# Patient Record
Sex: FEMALE | Race: WHITE | NOT HISPANIC OR LATINO | Employment: FULL TIME | URBAN - METROPOLITAN AREA
[De-identification: names, ages, dates, MRNs, and addresses within clinical notes are randomized per-mention and may not be internally consistent; named-entity substitution may affect disease eponyms.]

---

## 2017-05-18 ENCOUNTER — TELEPHONE (OUTPATIENT)
Dept: FAMILY MEDICINE | Facility: CLINIC | Age: 47
End: 2017-05-18

## 2017-05-18 NOTE — TELEPHONE ENCOUNTER
Patient declined assessment and states she is safe.  Denies thoughts of suicide and states been on Zoloft for an increasted time and thinks she would like to explore other medication options as she is also going through divorce.  Ok to wait and will call with any further concerns.  Maeve West RN  [facility] [employer]

## 2017-05-18 NOTE — TELEPHONE ENCOUNTER
[facility].  Routing to Triage to see if this requires a phone call      Sara LARSON

## 2017-05-31 ENCOUNTER — OFFICE VISIT (OUTPATIENT)
Dept: FAMILY MEDICINE | Facility: CLINIC | Age: 47
End: 2017-05-31
Payer: COMMERCIAL

## 2017-05-31 VITALS
TEMPERATURE: 98.1 F | BODY MASS INDEX: 31.64 KG/M2 | DIASTOLIC BLOOD PRESSURE: 76 MMHG | SYSTOLIC BLOOD PRESSURE: 110 MMHG | HEART RATE: 99 BPM | HEIGHT: 68 IN | WEIGHT: 208.8 LBS | OXYGEN SATURATION: 98 %

## 2017-05-31 DIAGNOSIS — F33.1 MODERATE EPISODE OF RECURRENT MAJOR DEPRESSIVE DISORDER (H): Primary | ICD-10-CM

## 2017-05-31 DIAGNOSIS — E66.811 OBESITY, CLASS I, BMI 30-34.9: ICD-10-CM

## 2017-05-31 DIAGNOSIS — F41.1 GENERALIZED ANXIETY DISORDER: ICD-10-CM

## 2017-05-31 PROCEDURE — 99213 OFFICE O/P EST LOW 20 MIN: CPT | Performed by: NURSE PRACTITIONER

## 2017-05-31 RX ORDER — BUPROPION HYDROCHLORIDE 150 MG/1
150 TABLET ORAL EVERY MORNING
Qty: 30 TABLET | Refills: 1 | Status: SHIPPED | OUTPATIENT
Start: 2017-05-31 | End: 2017-06-29 | Stop reason: DRUGHIGH

## 2017-05-31 ASSESSMENT — ANXIETY QUESTIONNAIRES
3. WORRYING TOO MUCH ABOUT DIFFERENT THINGS: MORE THAN HALF THE DAYS
7. FEELING AFRAID AS IF SOMETHING AWFUL MIGHT HAPPEN: SEVERAL DAYS
5. BEING SO RESTLESS THAT IT IS HARD TO SIT STILL: NOT AT ALL
6. BECOMING EASILY ANNOYED OR IRRITABLE: SEVERAL DAYS
IF YOU CHECKED OFF ANY PROBLEMS ON THIS QUESTIONNAIRE, HOW DIFFICULT HAVE THESE PROBLEMS MADE IT FOR YOU TO DO YOUR WORK, TAKE CARE OF THINGS AT HOME, OR GET ALONG WITH OTHER PEOPLE: SOMEWHAT DIFFICULT
1. FEELING NERVOUS, ANXIOUS, OR ON EDGE: MORE THAN HALF THE DAYS
2. NOT BEING ABLE TO STOP OR CONTROL WORRYING: SEVERAL DAYS
GAD7 TOTAL SCORE: 9

## 2017-05-31 ASSESSMENT — PATIENT HEALTH QUESTIONNAIRE - PHQ9: 5. POOR APPETITE OR OVEREATING: MORE THAN HALF THE DAYS

## 2017-05-31 NOTE — PROGRESS NOTES
SUBJECTIVE:                                                    Eileen Valladares is a 46 year old female who presents to clinic today for the following health issues:        Medication Followup of Zoloft      Taking Medication as prescribed: yes    Side Effects:  None    Medication Helping Symptoms:  No     Abnormal Mood Symptoms     Onset: x 1 1/2 years ago     Description:   Depression: YES  Anxiety: no     Accompanying Signs & Symptoms:  Still participating in activities that you used to enjoy: YES  Fatigue: YES  Irritability: YES, sometime  Difficulty concentrating: YES  Changes in appetite: no  Problems with sleep: YES  Heart racing/beating fast : no  Thoughts of hurting yourself or others: none     History:   Recent stress: YES- Divorce and mother health condition   Prior depression hospitalization: None  Family history of depression: YES-  Mother   Family history of anxiety: YES- sister       Precipitating factors:   Alcohol/drug use: YES-  Soon to be ex -     Alleviating factors:  Trying to sleep        Therapies Tried and outcome: Zoloft (Sertraline)           Problem list and histories reviewed & adjusted, as indicated.    Additional history:  Under a lot of stress right now.  Divorce almost final. Has 2 sons ages 19 & 16. They live with father. She is sad she doesn't see them that often. She has a new BF that the younger son doesn't like.  Her mother is ill after having thyroid surgery. A sister lives with her.  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  Takes sertraline 150 mg as prescribed by her gynecologist for depression and anxiety. Depression is worse. Comes home from work and just wants to sit. Weight stable but wants to lose weight.     Patient Active Problem List   Diagnosis     GRANULOMA LUNG     CYST KIDNEY     Generalized anxiety disorder     Hyperlipidemia with target LDL less than 130     Temporomandibular joint disorder     Obesity, Class I, BMI 30-34.9     Past Surgical History:    Procedure Laterality Date     BIOPSY Bilateral Breast     benign     COLONOSCOPY       negative     DILATION AND CURETTAGE      miscarriage     TONSILLECTOMY & ADENOIDECTOMY      as a child       Social History   Substance Use Topics     Smoking status: Never Smoker     Smokeless tobacco: Never Used     Alcohol use 0.6 oz/week     1 Standard drinks or equivalent per week      Comment: 1 drink per week     Family History   Problem Relation Age of Onset     Hypertension Mother      Other Cancer Mother      Lung () & Kidney ()     Depression Mother      C.A.D. Father       age 64 of  ischemic bowel     Colon Polyps Father      benign     DIABETES Father       at age 64     Hypertension Father      also high cholesterol     Hyperlipidemia Father      CEREBROVASCULAR DISEASE Father      ischemic strokes, questionable alzheimers, first cva at 58, first MI in his 40s, 2 CABG procedures for MIs     CANCER Paternal Grandmother      Cardiovascular Paternal Uncle      stroke in his 50s     DIABETES Maternal Grandmother       at age 76     Hypertension Maternal Grandmother      DIABETES Maternal Grandfather      diet at age 76     Hypertension Maternal Grandfather      Anxiety Disorder Sister          Current Outpatient Prescriptions   Medication Sig Dispense Refill     buPROPion (WELLBUTRIN XL) 150 MG 24 hr tablet Take 1 tablet (150 mg) by mouth every morning 30 tablet 1     Cholecalciferol (VITAMIN D) 2000 UNITS tablet Take 2,000 Units by mouth daily 100 tablet 3     CALCIUM + D 600-200 MG-UNIT OR TABS 2 TABLET DAILY       ZOLOFT 100 MG OR TABS 1.5 tabs every day (Patient taking differently: 1.5 tabs every day 150MG DAILY) 45 11     MULTI-VITAMIN OR TABS        clindamycin-benzoyl peroxide (BENZACLIN) gel Apply topically 2 times daily 50 g 11       ROS:  C: NEGATIVE for fever, chills, change in weight  E/M: NEGATIVE for ear, mouth and throat problems  R: NEGATIVE for significant cough or  "SOB  CV: NEGATIVE for chest pain, palpitations or peripheral edema    OBJECTIVE:                                                    /76 (BP Location: Right arm, Patient Position: Chair, Cuff Size: Adult Regular)  Pulse 99  Temp 98.1  F (36.7  C) (Tympanic)  Ht 5' 8\" (1.727 m)  Wt 208 lb 12.8 oz (94.7 kg)  SpO2 98%  BMI 31.75 kg/m2  Body mass index is 31.75 kg/(m^2).   GENERAL: healthy, alert, well nourished, well hydrated, no distress  HENT: ear canals- normal; TMs- normal; Nose- normal; Mouth- no ulcers, no lesions  NECK: no tenderness, no adenopathy, no asymmetry, no masses, no stiffness; thyroid- normal to palpation  RESP: lungs clear to auscultation - no rales, no rhonchi, no wheezes  CV: regular rates and rhythm, normal S1 S2, no S3 or S4 and no murmur, no click or rub -  ABDOMEN: soft, no tenderness, no  hepatosplenomegaly, no masses, normal bowel sounds  PSYCH: Alert and oriented times 3; speech- coherent , normal rate and volume; able to articulate logical thoughts, able to abstract reason, no tangential thoughts, no hallucinations or delusions, affect- normal  MENTAL STATUS EXAM:  Appearance/Behavior: Neatly groomed  Speech: Normal  Mood/Affect: depressed affect  Insight: Adequate    PHQ 9 = 14  MARIAN 7 = 9      ASSESSMENT/PLAN:                                                        ICD-10-CM    1. Moderate episode of recurrent major depressive disorder (H) F33.1 buPROPion (WELLBUTRIN XL) 150 MG 24 hr tablet   2. Generalized anxiety disorder F41.1    3. Obesity, Class I, BMI 30-34.9 E66.9        Discussed condition   I have discussed with patient the risks, benefits, medications, treatment options and modalities.  -Continue sertraline  - Add Wellbutrin  Follow up in 4 weeks, sooner prn   Advise she have \"talk therapy\"  with the [place] EASUSANNAH Loya CNP  [facility]      "

## 2017-05-31 NOTE — NURSING NOTE
"Chief Complaint   Patient presents with     Recheck Medication     Anxiety     Zoloft 100 mg tab     Depression       Initial /76 (BP Location: Right arm, Patient Position: Chair, Cuff Size: Adult Regular)  Pulse 99  Temp 98.1  F (36.7  C) (Tympanic)  Ht 5' 8\" (1.727 m)  Wt 208 lb 12.8 oz (94.7 kg)  SpO2 98%  BMI 31.75 kg/m2 Estimated body mass index is 31.75 kg/(m^2) as calculated from the following:    Height as of this encounter: 5' 8\" (1.727 m).    Weight as of this encounter: 208 lb 12.8 oz (94.7 kg).  Medication Reconciliation: complete     Denita Cisneros MA     "

## 2017-05-31 NOTE — MR AVS SNAPSHOT
"              After Visit Summary   5/31/2017    Eileen Valladares    MRN: [id number]           Patient Information     Date Of Birth          1970        Visit Information        Provider Department      5/31/2017 4:00 PM Mamie Pedersen APRN CNP [facility]        Today's Diagnoses     Moderate episode of recurrent major depressive disorder (H)    -  1    Generalized anxiety disorder        Obesity, Class I, BMI 30-34.9           Follow-ups after your visit        Who to contact     If you have questions or need follow up information about today's clinic visit or your schedule please contact [facility] directly at [phone].  Normal or non-critical lab and imaging results will be communicated to you by MyChart, letter or phone within 4 business days after the clinic has received the results. If you do not hear from us within 7 days, please contact the clinic through [employer] or phone. If you have a critical or abnormal lab result, we will notify you by phone as soon as possible.  Submit refill requests through [employer] or call your pharmacy and they will forward the refill request to us. Please allow 3 business days for your refill to be completed.          Additional Information About Your Visit        MyChart Information     [employer] gives you secure access to your electronic health record. If you see a primary care provider, you can also send messages to your care team and make appointments. If you have questions, please call your primary care clinic.  If you do not have a primary care provider, please call [phone] and they will assist you.        Care EveryWhere ID     This is your Care EveryWhere ID. This could be used by other organizations to access your [place] medical records  [id number]        Your Vitals Were     Pulse Temperature Height Pulse Oximetry BMI (Body Mass Index)       99 98.1  F (36.7  C) (Tympanic) 5' 8\" (1.727 m) 98% 31.75 kg/m2        Blood " Pressure from Last 3 Encounters:   05/31/17 110/76   08/09/16 124/84   09/23/15 115/78    Weight from Last 3 Encounters:   05/31/17 208 lb 12.8 oz (94.7 kg)   08/09/16 205 lb (93 kg)   09/23/15 208 lb (94.3 kg)              Today, you had the following     No orders found for display         Today's Medication Changes          These changes are accurate as of: 5/31/17  5:39 PM.  If you have any questions, ask your nurse or doctor.               Start taking these medicines.        Dose/Directions    buPROPion 150 MG 24 hr tablet   Commonly known as:  WELLBUTRIN XL   Used for:  Moderate episode of recurrent major depressive disorder (H)   Started by:  Mamie Pedersen APRN CNP        Dose:  150 mg   Take 1 tablet (150 mg) by mouth every morning   Quantity:  30 tablet   Refills:  1         These medicines have changed or have updated prescriptions.        Dose/Directions    ZOLOFT 100 MG tablet   This may have changed:  See the new instructions.   Used for:  Generalized anxiety disorder   Generic drug:  sertraline        1.5 tabs every day   Quantity:  45   Refills:  11            Where to get your medicines      These medications were sent to [facility] [place] - [address]  [address] [place] [id number]     Phone:  [phone]     buPROPion 150 MG 24 hr tablet                Primary Care Provider Office Phone # Fax #    Mic Jah Martinez [phone]       [facility] [address] [facility] [address]  [facility] [address]        Thank you!     Thank you for choosing [facility]  for your care. Our goal is always to provide you with excellent care. Hearing back from our patients is one way we can continue to improve our services. Please take a few minutes to complete the written survey that you may receive in the mail after your visit with us. Thank you!             Your Updated Medication List - Protect others around you: Learn  how to safely use, store and throw away your medicines at www.disposemymeds.org.          This list is accurate as of: 5/31/17  5:39 PM.  Always use your most recent med list.                   Brand Name Dispense Instructions for use    buPROPion 150 MG 24 hr tablet    WELLBUTRIN XL    30 tablet    Take 1 tablet (150 mg) by mouth every morning       calcium + D 600-200 MG-UNIT Tabs   Generic drug:  calcium carbonate-vitamin D      2 TABLET DAILY       clindamycin-benzoyl peroxide gel    BENZACLIN    50 g    Apply topically 2 times daily       Multi-vitamin Tabs tablet   Generic drug:  multivitamin, therapeutic with minerals          vitamin D 2000 UNITS tablet     100 tablet    Take 2,000 Units by mouth daily       ZOLOFT 100 MG tablet   Generic drug:  sertraline     45    1.5 tabs every day

## 2017-06-01 ASSESSMENT — ANXIETY QUESTIONNAIRES: GAD7 TOTAL SCORE: 9

## 2017-06-01 ASSESSMENT — PATIENT HEALTH QUESTIONNAIRE - PHQ9: SUM OF ALL RESPONSES TO PHQ QUESTIONS 1-9: 14

## 2017-10-05 ENCOUNTER — HOSPITAL ENCOUNTER (OUTPATIENT)
Dept: MAMMOGRAPHY | Facility: CLINIC | Age: 47
Discharge: HOME | End: 2017-10-05
Attending: OBSTETRICS & GYNECOLOGY | Admitting: OBSTETRICS & GYNECOLOGY
Payer: COMMERCIAL

## 2017-10-05 DIAGNOSIS — N63.0 BREAST LUMP: ICD-10-CM

## 2017-10-05 PROCEDURE — G0279 TOMOSYNTHESIS, MAMMO: HCPCS

## 2017-12-12 ENCOUNTER — TELEPHONE (OUTPATIENT)
Dept: FAMILY MEDICINE | Facility: CLINIC | Age: 47
End: 2017-12-12

## 2017-12-12 NOTE — TELEPHONE ENCOUNTER
Pt is due now to update phq 9.  Follow up end date 12/31/17.  iPeen message sent to pt.    PHQ-9 SCORE 1/3/2008 5/31/2017 6/28/2017   Total Score 3 - -   Total Score MyChart - - 7 (Mild depression)   Total Score - 14 7

## 2017-12-27 ASSESSMENT — PATIENT HEALTH QUESTIONNAIRE - PHQ9: SUM OF ALL RESPONSES TO PHQ QUESTIONS 1-9: 4

## 2017-12-27 NOTE — TELEPHONE ENCOUNTER
PHQ-9 SCORE 5/31/2017 6/28/2017 12/27/2017   Total Score - - -   Total Score MyChart - 7 (Mild depression) -   Total Score 14 7 4     PHQ obtained.   Evy Kern RN   [facility] - Triage

## 2018-08-04 ENCOUNTER — HEALTH MAINTENANCE LETTER (OUTPATIENT)
Age: 48
End: 2018-08-04

## 2018-10-26 ENCOUNTER — HOSPITAL ENCOUNTER (OUTPATIENT)
Dept: MAMMOGRAPHY | Facility: CLINIC | Age: 48
End: 2018-10-26
Attending: OBSTETRICS & GYNECOLOGY
Payer: COMMERCIAL

## 2018-10-26 ENCOUNTER — HOSPITAL ENCOUNTER (OUTPATIENT)
Dept: MAMMOGRAPHY | Facility: CLINIC | Age: 48
Discharge: HOME | End: 2018-10-26
Attending: OBSTETRICS & GYNECOLOGY | Admitting: OBSTETRICS & GYNECOLOGY
Payer: COMMERCIAL

## 2018-10-26 DIAGNOSIS — N60.01 CYST OF RIGHT BREAST: ICD-10-CM

## 2018-10-26 DIAGNOSIS — R92.30 DENSE BREAST TISSUE: ICD-10-CM

## 2018-10-26 PROCEDURE — G0279 TOMOSYNTHESIS, MAMMO: HCPCS

## 2018-10-26 PROCEDURE — 76642 ULTRASOUND BREAST LIMITED: CPT | Mod: 50

## 2018-10-31 ENCOUNTER — THERAPY VISIT (OUTPATIENT)
Dept: PHYSICAL THERAPY | Facility: CLINIC | Age: 48
End: 2018-10-31
Payer: COMMERCIAL

## 2018-10-31 DIAGNOSIS — M25.511 SHOULDER PAIN, RIGHT: Primary | ICD-10-CM

## 2018-10-31 PROCEDURE — 97112 NEUROMUSCULAR REEDUCATION: CPT | Mod: GP | Performed by: PHYSICAL THERAPIST

## 2018-10-31 PROCEDURE — 97161 PT EVAL LOW COMPLEX 20 MIN: CPT | Mod: GP | Performed by: PHYSICAL THERAPIST

## 2018-10-31 PROCEDURE — 97110 THERAPEUTIC EXERCISES: CPT | Mod: GP | Performed by: PHYSICAL THERAPIST

## 2018-11-05 PROBLEM — M25.511 SHOULDER PAIN, RIGHT: Status: ACTIVE | Noted: 2018-11-05

## 2018-11-05 NOTE — PROGRESS NOTES
[facility] Initial Evaluation  Subjective:  Patient is a 48 year old female presenting with rehab right shoulder hpi. The history is provided by the patient.   Eileen Valladares is a 48 year old female with a right shoulder condition.  Condition occurred with:  Unknown cause.  Condition occurred: for unknown reasons.  This is a new condition  Pain began in July. PT referral on 10/26/18.    Patient reports pain:  Anterior, upper arm and lateral.    Pain is described as aching and sharp and is intermittent and reported as 8/10 (at worst).  Associated symptoms:  Loss of motion/stiffness, loss of strength and painful arc. Worse during: no pattern.  Symptoms are exacerbated by using arm at shoulder level and using arm behind back and relieved by rest, analgesics and ice.  Since onset symptoms are rapidly improving.  Special tests:  MRI (calcific tendinosis per pt).  Previous treatment includes other (cortisone injection 10/26).  There was significant improvement following previous treatment.  General health as reported by patient is good.  Pertinent medical history includes:  Overweight.  Medical allergies: no.  Other surgeries include:  None reported.  Current medications:  Anti-depressants.  Current occupation is Pharmacist - .  Patient is working in normal job without restrictions.  Primary job tasks include:  Prolonged sitting and driving (drives 100 miles a day).    Barriers include:  None as reported by the patient.    Red flags:  None as reported by the patient.                        Objective:  Standing Alignment:    Cervical/Thoracic:  Forward head  Shoulder/UE:  Rounded shoulders, protracted scapula L and protracted scapula R                                  Cervical/Thoracic Evaluation    Headaches: cervical  Cervical Myotomes:  normal                      Cervical Dermatomes:  normal                                   Shoulder Evaluation:  ROM:  AROM:   normal                                  Strength:    Flexion: Left:5/5   Pain:    Right: 5/5     Pain:   Extension:  Left: 5/5    Pain:    Right: 5/5    Pain:  Abduction:  Left: 5/5  Pain:    Right: 4/5     Pain:  Adduction:  Left: 4+/5    Pain:    Right: 4+/5     Pain:  Internal Rotation:  Left:5/5     Pain:    Right: 5/5     Pain:  External Rotation:   Left:5/5     Pain:   Right:4+/5     Pain:            Stability Testing:  normal      Special Tests:      Right shoulder positive for the following special tests:Impingement  Right shoulder negative for the following special tests:Rotator cuff tear and Acrimioclavicular  Palpation:      Right shoulder tenderness present at: Supraspinatus; Rhomboids; Upper Trap and Bicipital Groove  Mobility Tests:  normal                                                 General     ROS    Assessment/Plan:    Patient is a 48 year old female with right side shoulder complaints.    Patient has the following significant findings with corresponding treatment plan.                Diagnosis 1:  R shoulder pain  Pain -  hot/cold therapy, manual therapy, splint/taping/bracing/orthotics, self management, education and home program  Decreased ROM/flexibility - manual therapy, therapeutic exercise and home program  Decreased strength - therapeutic exercise, therapeutic activities and home program    Therapy Evaluation Codes:   1) History comprised of:   Personal factors that impact the plan of care:      None.    Comorbidity factors that impact the plan of care are:      None.     Medications impacting care: None.  2) Examination of Body Systems comprised of:   Body structures and functions that impact the plan of care:      Cervical spine, Shoulder and Thoracic Spine.   Activity limitations that impact the plan of care are:      Driving, Dressing, Lifting, Working and Sleeping.  3) Clinical presentation characteristics are:   Stable/Uncomplicated.  4) Decision-Making    Low complexity using  standardized patient assessment instrument and/or measureable assessment of functional outcome.  Cumulative Therapy Evaluation is: Low complexity.    Previous and current functional limitations:  (See Goal Flow Sheet for this information)    Short term and Long term goals: (See Goal Flow Sheet for this information)     Communication ability:  Patient appears to be able to clearly communicate and understand verbal and written communication and follow directions correctly.  Treatment Explanation - The following has been discussed with the patient:   RX ordered/plan of care  Anticipated outcomes  Possible risks and side effects  This patient would benefit from PT intervention to resume normal activities.   Rehab potential is good.    Frequency:  1 X week, once daily  Duration:  for 8 weeks  Discharge Plan:  Achieve all LTG.  Independent in home treatment program.  Reach maximal therapeutic benefit.    Please refer to the daily flowsheet for treatment today, total treatment time and time spent performing 1:1 timed codes.

## 2018-11-07 ENCOUNTER — THERAPY VISIT (OUTPATIENT)
Dept: PHYSICAL THERAPY | Facility: CLINIC | Age: 48
End: 2018-11-07
Payer: COMMERCIAL

## 2018-11-07 DIAGNOSIS — M25.511 SHOULDER PAIN, RIGHT: ICD-10-CM

## 2018-11-07 PROCEDURE — 97112 NEUROMUSCULAR REEDUCATION: CPT | Mod: GP | Performed by: PHYSICAL THERAPIST

## 2018-11-07 PROCEDURE — 97110 THERAPEUTIC EXERCISES: CPT | Mod: GP | Performed by: PHYSICAL THERAPIST

## 2018-11-14 ENCOUNTER — THERAPY VISIT (OUTPATIENT)
Dept: PHYSICAL THERAPY | Facility: CLINIC | Age: 48
End: 2018-11-14
Payer: COMMERCIAL

## 2018-11-14 DIAGNOSIS — M25.511 SHOULDER PAIN, RIGHT: ICD-10-CM

## 2018-11-14 PROCEDURE — 97112 NEUROMUSCULAR REEDUCATION: CPT | Mod: GP | Performed by: PHYSICAL THERAPIST

## 2018-11-14 PROCEDURE — 97110 THERAPEUTIC EXERCISES: CPT | Mod: GP | Performed by: PHYSICAL THERAPIST

## 2018-11-15 DIAGNOSIS — D73.89 SPLENIC LESION: ICD-10-CM

## 2018-11-15 DIAGNOSIS — K76.9 HEPATIC LESION: ICD-10-CM

## 2018-11-15 DIAGNOSIS — Q87.89 LOEYS-DIETZ SYNDROME: Primary | ICD-10-CM

## 2018-11-16 ENCOUNTER — HOSPITAL ENCOUNTER (OUTPATIENT)
Dept: MRI IMAGING | Facility: CLINIC | Age: 48
Discharge: HOME | End: 2018-11-16
Attending: INTERNAL MEDICINE | Admitting: INTERNAL MEDICINE
Payer: COMMERCIAL

## 2018-11-16 DIAGNOSIS — Q87.89 LOEYS-DIETZ SYNDROME: ICD-10-CM

## 2018-11-16 DIAGNOSIS — D73.89 SPLENIC LESION: ICD-10-CM

## 2018-11-16 DIAGNOSIS — K76.9 HEPATIC LESION: ICD-10-CM

## 2018-11-16 PROCEDURE — 25500064 ZZH RX 255 OP 636: Performed by: INTERNAL MEDICINE

## 2018-11-16 PROCEDURE — 74183 MRI ABD W/O CNTR FLWD CNTR: CPT

## 2018-11-16 PROCEDURE — A9585 GADOBUTROL INJECTION: HCPCS | Performed by: INTERNAL MEDICINE

## 2018-11-16 RX ORDER — GADOBUTROL 604.72 MG/ML
10 INJECTION INTRAVENOUS ONCE
Status: COMPLETED | OUTPATIENT
Start: 2018-11-16 | End: 2018-11-16

## 2018-11-16 RX ADMIN — GADOBUTROL 8 ML: 604.72 INJECTION INTRAVENOUS at 09:45

## 2018-11-26 ENCOUNTER — VIRTUAL VISIT (OUTPATIENT)
Dept: FAMILY MEDICINE | Facility: OTHER | Age: 48
End: 2018-11-26

## 2018-11-26 NOTE — PROGRESS NOTES
"Date:   Clinician: Herb Castillo  Clinician NPI: [id number]  Patient: Eileen Valladares  Patient : 1970  [facility] [address] [place] [address]  Patient Phone: [phone]  Visit Protocol: UTI  Patient Summary:  Eileen is a 48 year old ( : 1970 ) female who initiated a Visit for a presumed bladder infection. When asked the question \"Please sign me up to receive news, health information and promotions from [employer].\", Eileen responded \"No\".   Her symptoms started 1-3 days ago and consist of urgency, dysuria, and urinary frequency.   Symptom details   Urine color: The color of her urine is yellow.    Denied symptoms include foul-smelling urine, feeling as if the bladder is never empty, flank pain, chills, vaginal itching, nausea, urinary incontinence, vaginal discharge, abdominal pain, and vomiting. She does not feel feverish.   Eileen has not used any over-the-counter medications or home remedies to relieve her current symptoms.  Precipitating events  Eileen denies having a sexually transmitted disease.  Pertinent medical history  Eileen has had a bladder infection before but has not had any in the past 12 months. Her current symptoms are similar to her previous bladder infection symptoms.   Sulfamethoxazole-trimethoprim (Bactrim DS) and ciprofloxacin (Cipro) has been effective in treating her past bladder infections.   Eilene typically gets a yeast infection when she takes antibiotics. She has used fluconazole (Diflucan) to treat previous yeast infections. 1 dose of fluconazole (Diflucan) has typically been sufficient for symptoms to resolve in the past.   Eileen has not been prescribed antibiotics to prevent frequent or repeated bladder infections in the past. She has not experienced problems or side effects with any of the common antibiotics used to treat bladder infections.   Eileen does not have a history of kidney stones. She has not used a catheter or been a patient in " a hospital or nursing home in the past 2 weeks.   Eileen does not smoke or use smokeless tobacco.   MEDICATIONS: Wellbutrin XL oral, ALLERGIES: NKDA  Clinician Response:  Dear Eileen,  Based on the information you have provided, you likely have an acute urinary tract infection, also called a bladder infection. Bladder infections occur when bacteria from the outside of the body enters the urinary tract. Any part of the urinary system can be infected, but the bladder is the most common.  Medication information  I am prescribing:     Sulfamethoxazole-trimethoprim (Bactrim DS) 800-160 mg oral tablet. Take 1 tablet by mouth every 12 hours for 3 days. There are no refills with this prescription.   The medication I prescribed for your bladder infection is an antibiotic. Continue taking the medication until it is gone even if you feel better. If you get an upset stomach while taking antibiotics, taking the medication with food can help.   Yeast infections can be a common side effect of antibiotics. The most common symptom of a yeast infection is itchiness in and around the vagina. Other signs and symptoms include burning, redness, or a thick, white vaginal discharge that looks like cottage cheese and does not have a bad smell.  Self care  Urination helps to flush bacteria from the urinary tract. For this reason, drinking water and urinating often helps relieve some symptoms of a bladder infection and can decrease your risk of getting bladder infections in the future.  Other steps you can take to prevent future bladder infections include:     Wipe front to back after using the bathroom    Urinate after sexual intercourse    Avoid using deodorant sprays, douches, or powders in the vaginal area     When to seek care  Please make an appointment to be seen in a clinic or urgent care if any of the following occur:     You develop new symptoms or your symptoms become worse    You have medication side effects that make it difficult  to take them as prescribed    Your symptoms do not improve within 1-2 days of starting treatment    You have symptoms of a bladder infection that return shortly after completing treatment     It is possible to have an allergic reaction to an antibiotic even if you have not had one in the past. If you notice a new rash, significant swelling, or difficulty breathing, stop taking this medication immediately and go to a clinic or urgent care.   Diagnosis: Acute uncomplicated bladder infection  Diagnosis ICD: N39.0  Prescription: sulfamethoxazole-trimethoprim (Bactrim DS) 800-160 mg oral tablet 6 tablet, 3 days supply. Take 1 tablet by mouth every 12 hours for 3 days. Refills: 0, Refill as needed: no, Allow substitutions: yes  Pharmacy: [facility] - [phone] - [id number] [place] [id number]

## 2018-11-28 ENCOUNTER — THERAPY VISIT (OUTPATIENT)
Dept: PHYSICAL THERAPY | Facility: CLINIC | Age: 48
End: 2018-11-28
Payer: COMMERCIAL

## 2018-11-28 DIAGNOSIS — M25.511 SHOULDER PAIN, RIGHT: ICD-10-CM

## 2018-11-28 PROCEDURE — 97110 THERAPEUTIC EXERCISES: CPT | Mod: GP | Performed by: PHYSICAL THERAPIST

## 2018-11-28 PROCEDURE — 97140 MANUAL THERAPY 1/> REGIONS: CPT | Mod: GP | Performed by: PHYSICAL THERAPIST

## 2018-11-30 ENCOUNTER — OFFICE VISIT (OUTPATIENT)
Dept: FAMILY MEDICINE | Facility: OTHER | Age: 48
End: 2018-11-30
Payer: COMMERCIAL

## 2018-11-30 VITALS
HEIGHT: 67 IN | HEART RATE: 76 BPM | SYSTOLIC BLOOD PRESSURE: 110 MMHG | BODY MASS INDEX: 26.37 KG/M2 | RESPIRATION RATE: 14 BRPM | DIASTOLIC BLOOD PRESSURE: 86 MMHG | TEMPERATURE: 97.9 F | WEIGHT: 168 LBS

## 2018-11-30 DIAGNOSIS — F33.1 MODERATE EPISODE OF RECURRENT MAJOR DEPRESSIVE DISORDER (H): ICD-10-CM

## 2018-11-30 DIAGNOSIS — B37.31 CANDIDIASIS OF VULVA AND VAGINA: ICD-10-CM

## 2018-11-30 DIAGNOSIS — Z12.11 SCREEN FOR COLON CANCER: ICD-10-CM

## 2018-11-30 DIAGNOSIS — Z11.4 SCREENING FOR HIV (HUMAN IMMUNODEFICIENCY VIRUS): ICD-10-CM

## 2018-11-30 DIAGNOSIS — Z12.4 SCREENING FOR MALIGNANT NEOPLASM OF CERVIX: ICD-10-CM

## 2018-11-30 DIAGNOSIS — N39.0 RECURRENT URINARY TRACT INFECTION: Primary | ICD-10-CM

## 2018-11-30 DIAGNOSIS — K76.9 LIVER LESION: Primary | ICD-10-CM

## 2018-11-30 DIAGNOSIS — J84.10 POSTINFLAMMATORY PULMONARY FIBROSIS (H): ICD-10-CM

## 2018-11-30 LAB
ALBUMIN UR-MCNC: NEGATIVE MG/DL
APPEARANCE UR: CLEAR
BACTERIA #/AREA URNS HPF: ABNORMAL /HPF
BILIRUB UR QL STRIP: NEGATIVE
COLOR UR AUTO: YELLOW
GLUCOSE UR STRIP-MCNC: NEGATIVE MG/DL
HGB UR QL STRIP: ABNORMAL
KETONES UR STRIP-MCNC: NEGATIVE MG/DL
LEUKOCYTE ESTERASE UR QL STRIP: ABNORMAL
NITRATE UR QL: NEGATIVE
NON-SQ EPI CELLS #/AREA URNS LPF: ABNORMAL /LPF
PH UR STRIP: 6 PH (ref 5–7)
RBC #/AREA URNS AUTO: ABNORMAL /HPF
SOURCE: ABNORMAL
SP GR UR STRIP: 1.01 (ref 1–1.03)
SPECIMEN SOURCE: NORMAL
UROBILINOGEN UR STRIP-ACNC: 0.2 EU/DL (ref 0.2–1)
WBC #/AREA URNS AUTO: ABNORMAL /HPF
WET PREP SPEC: NORMAL

## 2018-11-30 PROCEDURE — 99213 OFFICE O/P EST LOW 20 MIN: CPT | Performed by: NURSE PRACTITIONER

## 2018-11-30 PROCEDURE — 87210 SMEAR WET MOUNT SALINE/INK: CPT | Performed by: NURSE PRACTITIONER

## 2018-11-30 PROCEDURE — 87086 URINE CULTURE/COLONY COUNT: CPT | Performed by: NURSE PRACTITIONER

## 2018-11-30 PROCEDURE — 81001 URINALYSIS AUTO W/SCOPE: CPT | Performed by: NURSE PRACTITIONER

## 2018-11-30 RX ORDER — CLOTRIMAZOLE 1 %
CREAM (GRAM) TOPICAL 2 TIMES DAILY
Qty: 15 G | Refills: 1 | Status: SHIPPED | OUTPATIENT
Start: 2018-11-30 | End: 2019-04-03

## 2018-11-30 RX ORDER — FLUCONAZOLE 150 MG/1
150 TABLET ORAL ONCE
Qty: 1 TABLET | Refills: 0 | Status: SHIPPED | OUTPATIENT
Start: 2018-11-30 | End: 2019-04-03

## 2018-11-30 RX ORDER — CIPROFLOXACIN 500 MG/1
500 TABLET, FILM COATED ORAL 2 TIMES DAILY
Qty: 14 TABLET | Refills: 0 | Status: SHIPPED | OUTPATIENT
Start: 2018-11-30 | End: 2019-04-03

## 2018-11-30 ASSESSMENT — PAIN SCALES - GENERAL: PAINLEVEL: MODERATE PAIN (4)

## 2018-11-30 ASSESSMENT — ANXIETY QUESTIONNAIRES
4. TROUBLE RELAXING: SEVERAL DAYS
GAD7 TOTAL SCORE: 10
6. BECOMING EASILY ANNOYED OR IRRITABLE: SEVERAL DAYS
GAD7 TOTAL SCORE: 10
7. FEELING AFRAID AS IF SOMETHING AWFUL MIGHT HAPPEN: MORE THAN HALF THE DAYS
2. NOT BEING ABLE TO STOP OR CONTROL WORRYING: MORE THAN HALF THE DAYS
1. FEELING NERVOUS, ANXIOUS, OR ON EDGE: MORE THAN HALF THE DAYS
3. WORRYING TOO MUCH ABOUT DIFFERENT THINGS: MORE THAN HALF THE DAYS
GAD7 TOTAL SCORE: 10
7. FEELING AFRAID AS IF SOMETHING AWFUL MIGHT HAPPEN: MORE THAN HALF THE DAYS
5. BEING SO RESTLESS THAT IT IS HARD TO SIT STILL: NOT AT ALL

## 2018-11-30 ASSESSMENT — PATIENT HEALTH QUESTIONNAIRE - PHQ9
SUM OF ALL RESPONSES TO PHQ QUESTIONS 1-9: 6
SUM OF ALL RESPONSES TO PHQ QUESTIONS 1-9: 6
10. IF YOU CHECKED OFF ANY PROBLEMS, HOW DIFFICULT HAVE THESE PROBLEMS MADE IT FOR YOU TO DO YOUR WORK, TAKE CARE OF THINGS AT HOME, OR GET ALONG WITH OTHER PEOPLE: SOMEWHAT DIFFICULT

## 2018-11-30 NOTE — MR AVS SNAPSHOT
After Visit Summary   11/30/2018    Eileen Valladares    MRN: [id number]           Patient Information     Date Of Birth          1970        Visit Information        Provider Department      11/30/2018 10:40 AM Cassandra Roy NP [facility]        Today's Diagnoses     Recurrent urinary tract infection    -  1    Candidiasis of vulva and vagina        Moderate episode of recurrent major depressive disorder (H)        Postinflammatory pulmonary fibrosis (H)        Screen for colon cancer        Screening for malignant neoplasm of cervix        Screening for HIV (human immunodeficiency virus)           Follow-ups after your visit        Follow-up notes from your care team     Return in about 3 days (around 12/3/2018), or if symptoms worsen or fail to improve.      Your next 10 appointments already scheduled     Dec 03, 2018  2:30 PM CST   Lab with  LAB   [facility])    [address]  1st Floor  [facility] [id number] [phone]            Dec 03, 2018  3:30 PM CST   (Arrive by 3:15 PM)   New General Liver with Saskia Plascencia MD   [facility] ([facility])    [address]  [address]  [facility] [id number] [phone]            Dec 05, 2018  7:40 AM CST   [facility] Extremity with Karly Rabago, PT   [place] [facility]    [address]  [facility] [address] [phone]              Who to contact     If you have questions or need follow up information about today's clinic visit or your schedule please contact [facility] directly at [phone].  Normal or non-critical lab and imaging results will be communicated to you by MyChart, letter or phone within 4 business days after the clinic has received the results. If you do not hear from us within 7 days, please contact the clinic through  "Yik Yakhart or phone. If you have a critical or abnormal lab result, we will notify you by phone as soon as possible.  Submit refill requests through EasyRun or call your pharmacy and they will forward the refill request to us. Please allow 3 business days for your refill to be completed.          Additional Information About Your Visit        Yik Y[facility]art Information     EasyRun gives you secure access to your electronic health record. If you see a primary care provider, you can also send messages to your care team and make appointments. If you have questions, please call your primary care clinic.  If you do not have a primary care provider, please call [phone] and they will assist you.        Care EveryWhere ID     This is your Care EveryWhere ID. This could be used by other organizations to access your [place] medical records  [id number]        Your Vitals Were     Pulse Temperature Respirations Height BMI (Body Mass Index)       76 97.9  F (36.6  C) (Temporal) 14 5' 7\" (1.702 m) 26.31 kg/m2        Blood Pressure from Last 3 Encounters:   11/30/18 110/86   05/31/17 110/76   08/09/16 124/84    Weight from Last 3 Encounters:   11/30/18 168 lb (76.2 kg)   05/31/17 208 lb 12.8 oz (94.7 kg)   08/09/16 205 lb (93 kg)              We Performed the Following     *UA reflex to Microscopic and Culture ([place] [facility] (except [place] and [place])     Urine Microscopic     Wet prep          Today's Medication Changes          These changes are accurate as of 11/30/18 11:21 AM.  If you have any questions, ask your nurse or doctor.               Start taking these medicines.        Dose/Directions    ciprofloxacin 500 MG tablet   Commonly known as:  CIPRO   Used for:  Recurrent urinary tract infection   Started by:  Cassandra Roy, NP        Dose:  500 mg   Take 1 tablet (500 mg) by mouth 2 times daily   Quantity:  14 tablet   Refills:  0       clotrimazole 1 % external cream   Commonly known as:  LOTRIMIN "   Used for:  Candidiasis of vulva and vagina   Started by:  Cassandra Roy NP        Apply topically 2 times daily   Quantity:  15 g   Refills:  1       fluconazole 150 MG tablet   Commonly known as:  DIFLUCAN   Used for:  Candidiasis of vulva and vagina   Started by:  Cassandra Roy NP        Dose:  150 mg   Take 1 tablet (150 mg) by mouth once for 1 dose   Quantity:  1 tablet   Refills:  0       phenazopyridine 97.5 MG tablet   Commonly known as:  AZO   Used for:  Recurrent urinary tract infection   Started by:  Cassandra Roy NP        Dose:  195 mg   Take 2 tablets (195 mg) by mouth 3 times daily as needed for urinary tract discomfort   Quantity:  12 tablet   Refills:  0            Where to get your medicines      These medications were sent to [facility] [place] - [address]   [address] [place] [address]     Phone:  [phone]     ciprofloxacin 500 MG tablet    clotrimazole 1 % external cream    fluconazole 150 MG tablet    phenazopyridine 97.5 MG tablet                Primary Care Provider Office Phone # Fax #    Mic Jah Martinez [phone]       [facility] [address]  [facility] [address]        Equal Access to Services     [facility] AH: Hadii leona ku hadasho Soomaali, waaxda luqadaha, qaybta kaalmada adeegyada, bin morrow haykamran rockwell. So [facility] [phone].    ATENCIÓN: Si habla español, tiene a villarreal disposición servicios gratuitos de asistencia lingüística. Llame al [phone].    We comply with applicable federal civil rights laws and [place] laws. We do not discriminate on the basis of race, color, national origin, age, disability, sex, sexual orientation, or gender identity.            Thank you!     Thank you for choosing [facility]  for your care. Our goal is always to provide you with excellent care. Hearing back from our patients is one way we can continue to  improve our services. Please take a few minutes to complete the written survey that you may receive in the mail after your visit with us. Thank you!             Your Updated Medication List - Protect others around you: Learn how to safely use, store and throw away your medicines at www.disposemymeds.org.          This list is accurate as of 11/30/18 11:21 AM.  Always use your most recent med list.                   Brand Name Dispense Instructions for use Diagnosis    buPROPion 300 MG 24 hr tablet    WELLBUTRIN XL    90 tablet    Take 1 tablet (300 mg) by mouth every morning    Generalized anxiety disorder       calcium + D 600-200 MG-UNIT Tabs   Generic drug:  calcium carbonate-vitamin D      2 TABLET DAILY        ciprofloxacin 500 MG tablet    CIPRO    14 tablet    Take 1 tablet (500 mg) by mouth 2 times daily    Recurrent urinary tract infection       clotrimazole 1 % external cream    LOTRIMIN    15 g    Apply topically 2 times daily    Candidiasis of vulva and vagina       fluconazole 150 MG tablet    DIFLUCAN    1 tablet    Take 1 tablet (150 mg) by mouth once for 1 dose    Candidiasis of vulva and vagina       Multi-vitamin tablet   Generic drug:  multivitamin w/minerals           phenazopyridine 97.5 MG tablet    AZO    12 tablet    Take 2 tablets (195 mg) by mouth 3 times daily as needed for urinary tract discomfort    Recurrent urinary tract infection       vitamin D3 2000 units tablet    CHOLECALCIFEROL    100 tablet    Take 2,000 Units by mouth daily    Hypovitaminosis D

## 2018-11-30 NOTE — PROGRESS NOTES
SUBJECTIVE:   Eileen Valladares is a 48 year old female who presents to clinic today for the following health issues:      HPI  URINARY TRACT SYMPTOMS      Duration:     Description  dysuria, urgency and back pain    Intensity:  moderate    Accompanying signs and symptoms:  Fever/chills: no   Flank pain no   Nausea and vomiting: no   Vaginal symptoms: none and itching  Abdominal/Pelvic Pain: no     History  History of frequent UTI's: no   History of kidney stones: no   Sexually Active: YES  Possibility of pregnancy: No    Precipitating or alleviating factors: None    Therapies tried and outcome: course of antibiotics - helped with symptoms for a brief time but symptoms have gotten worse since antibiotics were completed, ibuprofen and Tylenol   Outcome: A little bit of relief with the ibuprofen and tylenol.    Problem list and histories reviewed & adjusted, as indicated.  Additional history: as documented      Patient Active Problem List   Diagnosis     GRANULOMA LUNG     CYST KIDNEY     Generalized anxiety disorder     Hyperlipidemia with target LDL less than 130     Temporomandibular joint disorder     Obesity, Class I, BMI 30-34.9     Shoulder pain, right     Moderate episode of recurrent major depressive disorder (H)     Past Surgical History:   Procedure Laterality Date     BIOPSY Bilateral Breast     benign     COLONOSCOPY       negative     DILATION AND CURETTAGE      miscarriage     TONSILLECTOMY & ADENOIDECTOMY      as a child       Social History   Substance Use Topics     Smoking status: Never Smoker     Smokeless tobacco: Never Used     Alcohol use 0.6 oz/week     1 Standard drinks or equivalent per week      Comment: 1 drink per week     Family History   Problem Relation Age of Onset     Hypertension Mother      Other Cancer Mother      Lung () & Kidney ()     Depression Mother      C.A.D. Father       age 64 of  ischemic bowel     Colon Polyps Father      benign      "Diabetes Father       at age 64     Hypertension Father      also high cholesterol     Hyperlipidemia Father      Cerebrovascular Disease Father      ischemic strokes, questionable alzheimers, first cva at 58, first MI in his 40s, 2 CABG procedures for MIs     Cancer Paternal Grandmother      Cardiovascular Paternal Uncle      stroke in his 50s     Diabetes Maternal Grandmother       at age 76     Hypertension Maternal Grandmother      Diabetes Maternal Grandfather      diet at age 76     Hypertension Maternal Grandfather      Anxiety Disorder Sister          Current Outpatient Prescriptions   Medication Sig Dispense Refill     buPROPion (WELLBUTRIN XL) 300 MG 24 hr tablet Take 1 tablet (300 mg) by mouth every morning 90 tablet 1     CALCIUM + D 600-200 MG-UNIT OR TABS 2 TABLET DAILY       Cholecalciferol (VITAMIN D) 2000 UNITS tablet Take 2,000 Units by mouth daily 100 tablet 3     ciprofloxacin (CIPRO) 500 MG tablet Take 1 tablet (500 mg) by mouth 2 times daily 14 tablet 0     clotrimazole (LOTRIMIN) 1 % external cream Apply topically 2 times daily 15 g 1     fluconazole (DIFLUCAN) 150 MG tablet Take 1 tablet (150 mg) by mouth once for 1 dose 1 tablet 0     MULTI-VITAMIN OR TABS        phenazopyridine (AZO) 97.5 MG tablet Take 2 tablets (195 mg) by mouth 3 times daily as needed for urinary tract discomfort 12 tablet 0     Allergies   Allergen Reactions     No Known Drug Allergies        ROS:  Constitutional, HEENT, cardiovascular, pulmonary, gi and gu systems are negative, except as otherwise noted.    OBJECTIVE:     /86  Pulse 76  Temp 97.9  F (36.6  C) (Temporal)  Resp 14  Ht 5' 7\" (1.702 m)  Wt 168 lb (76.2 kg)  BMI 26.31 kg/m2  Body mass index is 26.31 kg/(m^2).  GENERAL: healthy, alert and no distress  NECK: no adenopathy, no asymmetry, masses, or scars and thyroid normal to palpation  RESP: lungs clear to auscultation - no rales, rhonchi or wheezes  CV: regular rate and rhythm, normal S1 " S2, no S3 or S4, no murmur, click or rub, no peripheral edema and peripheral pulses strong  ABDOMEN: soft, nontender, no hepatosplenomegaly, no masses and bowel sounds normal   (female): normal urethral meatus  and erythema and some excoriation around vaginal opening posteriorly  MS: no gross musculoskeletal defects noted, no edema    Diagnostic Test Results:  Results for orders placed or performed in visit on 11/30/18 (from the past 24 hour(s))   *UA reflex to Microscopic and Culture ([facility] (except [place] and [place])   Result Value Ref Range    Color Urine Yellow     Appearance Urine Clear     Glucose Urine Negative NEG^Negative mg/dL    Bilirubin Urine Negative NEG^Negative    Ketones Urine Negative NEG^Negative mg/dL    Specific Gravity Urine 1.010 1.003 - 1.035    Blood Urine Moderate (A) NEG^Negative    pH Urine 6.0 5.0 - 7.0 pH    Protein Albumin Urine Negative NEG^Negative mg/dL    Urobilinogen Urine 0.2 0.2 - 1.0 EU/dL    Nitrite Urine Negative NEG^Negative    Leukocyte Esterase Urine Trace (A) NEG^Negative    Source Unspecified Urine    Urine Microscopic   Result Value Ref Range    WBC Urine 0 - 5 OTO5^0 - 5 /HPF    RBC Urine 5-10 (A) OTO2^O - 2 /HPF    Squamous Epithelial /LPF Urine Few FEW^Few /LPF    Bacteria Urine Few (A) NEG^Negative /HPF   Wet prep   Result Value Ref Range    Specimen Description Vagina     Wet Prep No Trichomonas seen     Wet Prep No clue cells seen     Wet Prep No yeast seen        ASSESSMENT/PLAN:       1. Recurrent urinary tract infection  Will treat with cipro as last treated with bactrim.  Send culture.  Supportive cares discussed.   - *UA reflex to Microscopic and Culture ([facility] (except [place] and [place])  - Wet prep  - Urine Microscopic  - ciprofloxacin (CIPRO) 500 MG tablet; Take 1 tablet (500 mg) by mouth 2 times daily  Dispense: 14 tablet; Refill: 0  - phenazopyridine (AZO) 97.5 MG tablet; Take 2 tablets (195 mg) by  mouth 3 times daily as needed for urinary tract discomfort  Dispense: 12 tablet; Refill: 0    2. Candidiasis of vulva and vagina  Gets yeast infections with abx- script sent.  She does have some external excoriation and erythema- can treat with topical lotrimin  - fluconazole (DIFLUCAN) 150 MG tablet; Take 1 tablet (150 mg) by mouth once for 1 dose  Dispense: 1 tablet; Refill: 0  - clotrimazole (LOTRIMIN) 1 % external cream; Apply topically 2 times daily  Dispense: 15 g; Refill: 1    3. Moderate episode of recurrent major depressive disorder (H)  Sees per OB for this    4. Postinflammatory pulmonary fibrosis (H)  Sees her PCP for this    5. Screen for colon cancer/ cervical cancer, HIV- sees PCP for this and has appointment scheduled in January for physical.        Cassandra Roy, FRANCIS  [facility]      Answers for HPI/ROS submitted by the patient on 11/30/2018   If you checked off any problems, how difficult have these problems made it for you to do your work, take care of things at home, or get along with other people?: Somewhat difficult  PHQ9 TOTAL SCORE: 6  MARIAN 7 TOTAL SCORE: 10

## 2018-12-01 LAB
BACTERIA SPEC CULT: NORMAL
Lab: NORMAL
SPECIMEN SOURCE: NORMAL

## 2018-12-01 ASSESSMENT — ANXIETY QUESTIONNAIRES: GAD7 TOTAL SCORE: 10

## 2018-12-02 NOTE — PROGRESS NOTES
[facility] Clinic Consultation      REASON FOR CONSULTATION: liver lesion  REFERRING PROVIDER:    A/P  Eileen Valladares is a 48 year old female with an incidental liver lesion, aroung 1.5 cm. Less conspicuous on follow up 2 years later. May be fatty sparking or adenoma. Now off OCPS for last 8 years. No liver disease, UTD on cancer screening, normal liver tests.    No concenring features of the spot or her history. If it is an adenoma, would simply follow for a while. I will discuss with radiologist. Not of size that requires intervention.    Reviewed the rest of her MRI with her. ANy follow up for the kidney cysts would be through her PCP. Other findings are benign/normal.    Saskia Plascencia MD  Hepatology/Liver Transplant  Medical Director, Liver Transplantation  [facility]  Subjective  Eileen Valladares is a 48 year old female referred for liver lesion.    Spot in liver dates back to 2016 and is thought to be adenoma or focal fatty infiltration.    She was on OCPs from 15 to 26, then first child at age 28, then 2nd child at 31. Then she was on OCPS for a few years, last time was 8 years ago.. Now has a Mirena.    Imaging done for  MRI 11/16/18  1. Decreased conspicuity of an ill-defined nonenhancing, fat-containing lesion along the capsule in hepatic segment 2, which is most consistent with focal fat deposition, though a small adenoma remains a possibility.  2. Unchanged scattered T2 hyperintense enhancing splenic foci, most consistent with hemangiomas.  3. Decreased size of the previously described enlarged left periaortic  lymph node, which is now nonenlarged and was likely reactive.  4. 8 mm hemorrhagic/proteinaceous cyst in the lower pole of the right kidney. Scattered simple cysts elsewhere in the kidneys. Mild right pelvocaliectasis similar to prior exams.   5. No arterial aneurysm identified.    No history of liver disease.    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     Temporomandibular joint disorders, unspecified      Uterine fibroid        Social History     Social History     Marital status:      Spouse name: Clinton     Number of children: 2     Years of education: N/A     Occupational History     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 Yes     Father     Social History Narrative       Family History   Problem Relation Age of Onset     Hypertension Mother      Other Cancer Mother      Lung () & Kidney ()     Depression Mother      C.A.D. Father       age 64 of  ischemic bowel     Colon Polyps Father      benign     Diabetes Father       at age 64     Hypertension Father      also high cholesterol     Hyperlipidemia Father      Cerebrovascular Disease Father      ischemic strokes, questionable alzheimers, first cva at 58, first MI in his 40s, 2 CABG procedures for MIs     Cancer Paternal Grandmother      Cardiovascular Paternal Uncle      stroke in his 50s     Diabetes Maternal Grandmother       at age 76     Hypertension Maternal Grandmother      Diabetes Maternal  Grandfather      diet at age 76     Hypertension Maternal Grandfather      Anxiety Disorder Sister        ROS: 10 point ROS neg other than the symptoms noted above in the HPI.    /78  Pulse 101  Temp 98.7  F (37.1  C) (Oral)  Wt 77.2 kg (170 lb 3.2 oz)  SpO2 98%  BMI 26.66 kg/m2    Constitutional: alert and no distress.   Neck: Neck supple. No adenopathy. Thyroid symmetric, normal size  HEENT:Normocephalic. No masses, lesions, tenderness or abnormalities. No temporal muscle wasting ENT exam normal, no neck nodes or sinus tenderness. No oral lesions  Cardiovascular: negative, No lifts, heaves, or thrills. RRR. No murmurs, clicks gallops or rub  Respiratory: negative, Good diaphragmatic excursion. Lungs clear. No wheezes or rales  Gastrointestinal: Abdomen soft, non-tender. BS normal.  No masses, organomegaly  Skin: no suspicious lesions or rashes. No spider angiomata or palmar erythema. Nails normal.  Neurologic: Alert and oriented x3. No asterixis or tremor. Gait normal.   Psychiatric:  Appropriate, well groomed.  Hematologic/Lymphatic/Immunologic: Normal cervical and supraclavicular  lymph nodes

## 2018-12-03 ENCOUNTER — PATIENT OUTREACH (OUTPATIENT)
Dept: CARE COORDINATION | Facility: CLINIC | Age: 48
End: 2018-12-03

## 2018-12-03 ENCOUNTER — OFFICE VISIT (OUTPATIENT)
Dept: GASTROENTEROLOGY | Facility: CLINIC | Age: 48
End: 2018-12-03
Attending: INTERNAL MEDICINE
Payer: COMMERCIAL

## 2018-12-03 VITALS
WEIGHT: 170.2 LBS | BODY MASS INDEX: 26.66 KG/M2 | TEMPERATURE: 98.7 F | HEART RATE: 101 BPM | DIASTOLIC BLOOD PRESSURE: 78 MMHG | SYSTOLIC BLOOD PRESSURE: 120 MMHG | OXYGEN SATURATION: 98 %

## 2018-12-03 DIAGNOSIS — R16.0 LIVER MASS: Primary | ICD-10-CM

## 2018-12-03 DIAGNOSIS — K76.9 LIVER LESION: ICD-10-CM

## 2018-12-03 LAB
ALBUMIN SERPL-MCNC: 3.7 G/DL (ref 3.4–5)
ALP SERPL-CCNC: 71 U/L (ref 40–150)
ALT SERPL W P-5'-P-CCNC: 22 U/L (ref 0–50)
ANION GAP SERPL CALCULATED.3IONS-SCNC: 8 MMOL/L (ref 3–14)
AST SERPL W P-5'-P-CCNC: 11 U/L (ref 0–45)
BILIRUB DIRECT SERPL-MCNC: <0.1 MG/DL (ref 0–0.2)
BILIRUB SERPL-MCNC: 0.4 MG/DL (ref 0.2–1.3)
BUN SERPL-MCNC: 5 MG/DL (ref 7–30)
CALCIUM SERPL-MCNC: 8.6 MG/DL (ref 8.5–10.1)
CHLORIDE SERPL-SCNC: 105 MMOL/L (ref 94–109)
CO2 SERPL-SCNC: 25 MMOL/L (ref 20–32)
CREAT SERPL-MCNC: 0.55 MG/DL (ref 0.52–1.04)
ERYTHROCYTE [DISTWIDTH] IN BLOOD BY AUTOMATED COUNT: 12.5 % (ref 10–15)
GFR SERPL CREATININE-BSD FRML MDRD: >90 ML/MIN/1.7M2
GLUCOSE SERPL-MCNC: 83 MG/DL (ref 70–99)
HCT VFR BLD AUTO: 38.3 % (ref 35–47)
HGB BLD-MCNC: 13.2 G/DL (ref 11.7–15.7)
INR PPP: 1 (ref 0.86–1.14)
MCH RBC QN AUTO: 33 PG (ref 26.5–33)
MCHC RBC AUTO-ENTMCNC: 34.5 G/DL (ref 31.5–36.5)
MCV RBC AUTO: 96 FL (ref 78–100)
PLATELET # BLD AUTO: 296 10E9/L (ref 150–450)
POTASSIUM SERPL-SCNC: 3.7 MMOL/L (ref 3.4–5.3)
PROT SERPL-MCNC: 7 G/DL (ref 6.8–8.8)
RBC # BLD AUTO: 4 10E12/L (ref 3.8–5.2)
SODIUM SERPL-SCNC: 139 MMOL/L (ref 133–144)
WBC # BLD AUTO: 10.1 10E9/L (ref 4–11)

## 2018-12-03 PROCEDURE — 36415 COLL VENOUS BLD VENIPUNCTURE: CPT | Performed by: INTERNAL MEDICINE

## 2018-12-03 PROCEDURE — G0463 HOSPITAL OUTPT CLINIC VISIT: HCPCS | Mod: ZF

## 2018-12-03 PROCEDURE — 80076 HEPATIC FUNCTION PANEL: CPT | Performed by: INTERNAL MEDICINE

## 2018-12-03 PROCEDURE — 85610 PROTHROMBIN TIME: CPT | Performed by: INTERNAL MEDICINE

## 2018-12-03 PROCEDURE — 80048 BASIC METABOLIC PNL TOTAL CA: CPT | Performed by: INTERNAL MEDICINE

## 2018-12-03 PROCEDURE — 85027 COMPLETE CBC AUTOMATED: CPT | Performed by: INTERNAL MEDICINE

## 2018-12-03 ASSESSMENT — PAIN SCALES - GENERAL: PAINLEVEL: NO PAIN (0)

## 2018-12-03 NOTE — MR AVS SNAPSHOT
After Visit Summary   12/3/2018    Eileen Valladares    MRN: [id number]           Patient Information     Date Of Birth          1970        Visit Information        Provider Department      12/3/2018 3:30 PM Saskia Plascencia MD [facility] Hepatology        Today's Diagnoses     Liver mass    -  1      Care Instructions    Preventive Care:    Colorectal Cancer Screening: During our visit today, we discussed that it is recommended you receive colorectal cancer screening. Please call or make an appointment with your primary care provider to discuss this. You may also call the [facility] scheduling line ([phone]) to set up a colonoscopy appointment.              Follow-ups after your visit        Follow-up notes from your care team     Return in about 1 year (around 12/3/2019).      Your next 10 appointments already scheduled     Dec 05, 2018  7:40 AM CST   XIME[facility] Extremity with Karly Rabago, PT   [facility] Ther ([facility])    [address]  [facility] [id number] [phone]            Dec 02, 2019  3:00 PM CST   Lab with  LAB   [facility] Lab ([facility])    [address]  1st Floor  [facility] [address] [phone]            Dec 02, 2019  4:00 PM CST   (Arrive by 3:45 PM)   Return General Liver with Saskia Plascencia MD   [facility] Hepatology ([facility])    [address]  [address]  [facility] [address] [phone]              Who to contact     If you have questions or need follow up information about today's clinic visit or your schedule please contact [facility] HEPATOLOGY directly at [phone].  Normal or non-critical lab and imaging results will be communicated to you by MyChart, letter or phone within 4 business days after the clinic has received the results. If you do not hear from us within 7 days, please contact the clinic  through OYE! or phone. If you have a critical or abnormal lab result, we will notify you by phone as soon as possible.  Submit refill requests through OYE! or call your pharmacy and they will forward the refill request to us. Please allow 3 business days for your refill to be completed.          Additional Information About Your Visit        MOBITRAChart Information     OYE! gives you secure access to your electronic health record. If you see a primary care provider, you can also send messages to your care team and make appointments. If you have questions, please call your primary care clinic.  If you do not have a primary care provider, please call [phone] and they will assist you.        Care EveryWhere ID     This is your Care EveryWhere ID. This could be used by other organizations to access your [place] medical records  [id number]        Your Vitals Were     Pulse Temperature Pulse Oximetry BMI (Body Mass Index)          101 98.7  F (37.1  C) (Oral) 98% 26.66 kg/m2         Blood Pressure from Last 3 Encounters:   12/03/18 120/78   11/30/18 110/86   05/31/17 110/76    Weight from Last 3 Encounters:   12/03/18 77.2 kg (170 lb 3.2 oz)   11/30/18 76.2 kg (168 lb)   05/31/17 94.7 kg (208 lb 12.8 oz)              Today, you had the following     No orders found for display       Primary Care Provider Office Phone # Fax #    Mic Jah Martinez [phone]       [facility] [address]  [facility] [address]        Equal Access to Services     CYN PONCE : Hadii aad ku hadasho Soomaali, waaxda luqadaha, qaybta kaalmada adeegyada, bin barrientos . So [facility] [phone].    ATENCIÓN: Si habla español, tiene a villarreal disposición servicios gratuitos de asistencia lingüística. Llame al [phone].    We comply with applicable federal civil rights laws and [place] laws. We do not discriminate on the basis of race, color, national origin, age, disability,  sex, sexual orientation, or gender identity.            Thank you!     Thank you for choosing [facility] HEPATOLOGY  for your care. Our goal is always to provide you with excellent care. Hearing back from our patients is one way we can continue to improve our services. Please take a few minutes to complete the written survey that you may receive in the mail after your visit with us. Thank you!             Your Updated Medication List - Protect others around you: Learn how to safely use, store and throw away your medicines at www.disposemymeds.org.          This list is accurate as of 12/3/18 11:59 PM.  Always use your most recent med list.                   Brand Name Dispense Instructions for use Diagnosis    buPROPion 300 MG 24 hr tablet    WELLBUTRIN XL    90 tablet    Take 1 tablet (300 mg) by mouth every morning    Generalized anxiety disorder       calcium + D 600-200 MG-UNIT Tabs   Generic drug:  calcium carbonate-vitamin D      2 TABLET DAILY        ciprofloxacin 500 MG tablet    CIPRO    14 tablet    Take 1 tablet (500 mg) by mouth 2 times daily    Recurrent urinary tract infection       clotrimazole 1 % external cream    LOTRIMIN    15 g    Apply topically 2 times daily    Candidiasis of vulva and vagina       Multi-vitamin tablet   Generic drug:  multivitamin w/minerals           phenazopyridine 97.5 MG tablet    AZO    12 tablet    Take 2 tablets (195 mg) by mouth 3 times daily as needed for urinary tract discomfort    Recurrent urinary tract infection       vitamin D3 2000 units tablet    CHOLECALCIFEROL    100 tablet    Take 2,000 Units by mouth daily    Hypovitaminosis D

## 2018-12-03 NOTE — LETTER
12/3/2018      RE: Eileen Valladares  [address]  [facility] [address]       [facility] Liver Clinic Consultation      REASON FOR CONSULTATION: liver lesion  REFERRING PROVIDER:    A/P  Eileen Valladares is a 48 year old female with an incidental liver lesion, aroung 1.5 cm. Less conspicuous on follow up 2 years later. May be fatty sparking or adenoma. Now off OCPS for last 8 years. No liver disease, UTD on cancer screening, normal liver tests.    No concenring features of the spot or her history. If it is an adenoma, would simply follow for a while. I will discuss with radiologist. Not of size that requires intervention.    Reviewed the rest of her MRI with her. ANy follow up for the kidney cysts would be through her PCP. Other findings are benign/normal.    Saskia Plascencia MD  Hepatology/Liver Transplant  Medical Director, Liver Transplantation  [facility]  Subjective  Eileen Valladares is a 48 year old female referred for liver lesion.    Spot in liver dates back to 2016 and is thought to be adenoma or focal fatty infiltration.    She was on OCPs from 15 to 26, then first child at age 28, then 2nd child at 31. Then she was on OCPS for a few years, last time was 8 years ago.. Now has a Mirena.    Imaging done for  MRI 11/16/18  1. Decreased conspicuity of an ill-defined nonenhancing, fat-containing lesion along the capsule in hepatic segment 2, which is most consistent with focal fat deposition, though a small adenoma remains a possibility.  2. Unchanged scattered T2 hyperintense enhancing splenic foci, most consistent with hemangiomas.  3. Decreased size of the previously described enlarged left periaortic  lymph node, which is now nonenlarged and was likely reactive.  4. 8 mm hemorrhagic/proteinaceous cyst in the lower pole of the right kidney. Scattered simple cysts elsewhere in the kidneys. Mild right pelvocaliectasis similar to prior exams.   5. No arterial aneurysm  identified.    No history of liver disease.    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     Temporomandibular joint disorders, unspecified      Uterine fibroid        Social History     Social History     Marital status:      Spouse name: Clinton     Number of children: 2     Years of education: N/A     Occupational History     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 Yes     Father     Social History Narrative       Family History   Problem Relation Age of Onset     Hypertension Mother      Other Cancer Mother      Lung () & Kidney ()     Depression Mother      C.A.D. Father       age 64 of  ischemic bowel     Colon Polyps Father      benign     Diabetes Father       at age 64     Hypertension Father      also high cholesterol     Hyperlipidemia Father      Cerebrovascular Disease Father      ischemic strokes, questionable alzheimers, first cva at 58, first MI in his 40s, 2 CABG procedures for MIs     Cancer Paternal Grandmother      Cardiovascular Paternal Uncle      stroke in his 50s     Diabetes Maternal  Grandmother       at age 76     Hypertension Maternal Grandmother      Diabetes Maternal Grandfather      diet at age 76     Hypertension Maternal Grandfather      Anxiety Disorder Sister        ROS: 10 point ROS neg other than the symptoms noted above in the HPI.    /78  Pulse 101  Temp 98.7  F (37.1  C) (Oral)  Wt 77.2 kg (170 lb 3.2 oz)  SpO2 98%  BMI 26.66 kg/m2    Constitutional: alert and no distress.   Neck: Neck supple. No adenopathy. Thyroid symmetric, normal size  HEENT:Normocephalic. No masses, lesions, tenderness or abnormalities. No temporal muscle wasting ENT exam normal, no neck nodes or sinus tenderness. No oral lesions  Cardiovascular: negative, No lifts, heaves, or thrills. RRR. No murmurs, clicks gallops or rub  Respiratory: negative, Good diaphragmatic excursion. Lungs clear. No wheezes or rales  Gastrointestinal: Abdomen soft, non-tender. BS normal.  No masses, organomegaly  Skin: no suspicious lesions or rashes. No spider angiomata or palmar erythema. Nails normal.  Neurologic: Alert and oriented x3. No asterixis or tremor. Gait normal.   Psychiatric:  Appropriate, well groomed.  Hematologic/Lymphatic/Immunologic: Normal cervical and supraclavicular  lymph nodes        Saskia Plascencia MD

## 2018-12-03 NOTE — PATIENT INSTRUCTIONS
Preventive Care:    Colorectal Cancer Screening: During our visit today, we discussed that it is recommended you receive colorectal cancer screening. Please call or make an appointment with your primary care provider to discuss this. You may also call the [employer] scheduling line ([phone]) to set up a colonoscopy appointment.

## 2018-12-03 NOTE — NURSING NOTE
Chief Complaint   Patient presents with     Consult     Liver Lesion     /78  Pulse 101  Temp 98.7  F (37.1  C) (Oral)  Wt 77.2 kg (170 lb 3.2 oz)  SpO2 98%  BMI 26.66 kg/m2  Marjan Mix MA

## 2018-12-05 ENCOUNTER — THERAPY VISIT (OUTPATIENT)
Dept: PHYSICAL THERAPY | Facility: CLINIC | Age: 48
End: 2018-12-05
Payer: COMMERCIAL

## 2018-12-05 DIAGNOSIS — M25.511 SHOULDER PAIN, RIGHT: ICD-10-CM

## 2018-12-05 PROCEDURE — 97110 THERAPEUTIC EXERCISES: CPT | Mod: GP | Performed by: PHYSICAL THERAPIST

## 2018-12-05 PROCEDURE — 97112 NEUROMUSCULAR REEDUCATION: CPT | Mod: GP | Performed by: PHYSICAL THERAPIST

## 2018-12-05 PROCEDURE — 97140 MANUAL THERAPY 1/> REGIONS: CPT | Mod: GP | Performed by: PHYSICAL THERAPIST

## 2018-12-19 ENCOUNTER — THERAPY VISIT (OUTPATIENT)
Dept: PHYSICAL THERAPY | Facility: CLINIC | Age: 48
End: 2018-12-19
Payer: COMMERCIAL

## 2018-12-19 DIAGNOSIS — M25.511 SHOULDER PAIN, RIGHT: ICD-10-CM

## 2018-12-19 PROCEDURE — 97112 NEUROMUSCULAR REEDUCATION: CPT | Mod: GP | Performed by: PHYSICAL THERAPIST

## 2018-12-19 PROCEDURE — 97140 MANUAL THERAPY 1/> REGIONS: CPT | Mod: GP | Performed by: PHYSICAL THERAPIST

## 2018-12-19 PROCEDURE — 97110 THERAPEUTIC EXERCISES: CPT | Mod: GP | Performed by: PHYSICAL THERAPIST

## 2018-12-19 NOTE — PROGRESS NOTES
Subjective:                        Objective:  System    Physical Exam    General     ROS    Assessment/Plan:    PROGRESS  REPORT    Progress reporting period is from 10/31/18 to 12/19/18.     SUBJECTIVE  Shoulder has continued to improve from last week. Pt states she was 9/10 pain before the cortisone shot and therapy, now feeling about 2/10.   Current Pain level: 1/10   Initial Pain level: 8/10   Changes in function: (yes, see goal flow sheet)      OBJECTIVE  Shoulder AROM full in all planes, with pain at end range flexion and abduction.   Independent in HEP  Tender to palpation over R bicep; improves with STM    ASSESSMENT/PLAN  Diagnosis 1:  R shoulder pain  Pain -  hot/cold therapy, manual therapy, splint/taping/bracing/orthotics, self management, education and home program  Decreased ROM/flexibility - manual therapy, therapeutic exercise and home program  Decreased strength - therapeutic exercise, therapeutic activities and home program  STG/LTGs have been met or progress has been made towards goals:  Yes (See Goal flow sheet completed today.)  Assessment of Progress: The patient's condition is improving.  The patient's condition has potential to improve.  Patient is meeting short term goals and is progressing towards long term goals.  Self Management Plans:  Patient has been instructed in a home treatment program.  Patient is independent in a home treatment program.  Patient  has been instructed in self management of symptoms.  Patient is independent in self management of symptoms.  I have re-evaluated this patient and find that the nature, scope, duration and intensity of the therapy is appropriate for the medical condition of the patient.  Eileen continues to require the following intervention to meet STG and LTG's: PT  The patient is returning to your office for a recheck appointment.    Recommendations:  This patient would benefit from continued therapy.     Frequency:  1 X week, once daily  Duration:   for 4 weeks      Please refer to the daily flowsheet for treatment today, total treatment time and time spent performing 1:1 timed codes.

## 2019-01-04 ENCOUNTER — THERAPY VISIT (OUTPATIENT)
Dept: PHYSICAL THERAPY | Facility: CLINIC | Age: 49
End: 2019-01-04
Payer: COMMERCIAL

## 2019-01-04 DIAGNOSIS — M25.511 SHOULDER PAIN, RIGHT: ICD-10-CM

## 2019-01-04 PROCEDURE — 97110 THERAPEUTIC EXERCISES: CPT | Mod: GP | Performed by: PHYSICAL THERAPIST

## 2019-01-04 PROCEDURE — 97112 NEUROMUSCULAR REEDUCATION: CPT | Mod: GP | Performed by: PHYSICAL THERAPIST

## 2019-01-04 PROCEDURE — 97140 MANUAL THERAPY 1/> REGIONS: CPT | Mod: GP | Performed by: PHYSICAL THERAPIST

## 2019-01-11 ENCOUNTER — THERAPY VISIT (OUTPATIENT)
Dept: PHYSICAL THERAPY | Facility: CLINIC | Age: 49
End: 2019-01-11
Payer: COMMERCIAL

## 2019-01-11 DIAGNOSIS — M25.511 SHOULDER PAIN, RIGHT: ICD-10-CM

## 2019-01-11 PROCEDURE — 97140 MANUAL THERAPY 1/> REGIONS: CPT | Mod: GP | Performed by: PHYSICAL THERAPIST

## 2019-01-11 PROCEDURE — 97110 THERAPEUTIC EXERCISES: CPT | Mod: GP | Performed by: PHYSICAL THERAPIST

## 2019-01-11 PROCEDURE — 97112 NEUROMUSCULAR REEDUCATION: CPT | Mod: GP | Performed by: PHYSICAL THERAPIST

## 2019-01-17 ENCOUNTER — THERAPY VISIT (OUTPATIENT)
Dept: PHYSICAL THERAPY | Facility: CLINIC | Age: 49
End: 2019-01-17
Payer: COMMERCIAL

## 2019-01-17 DIAGNOSIS — M54.2 NECK PAIN: Primary | ICD-10-CM

## 2019-01-17 DIAGNOSIS — M25.511 SHOULDER PAIN, RIGHT: ICD-10-CM

## 2019-01-17 PROCEDURE — 97140 MANUAL THERAPY 1/> REGIONS: CPT | Mod: GP | Performed by: PHYSICAL THERAPIST

## 2019-01-17 PROCEDURE — 97110 THERAPEUTIC EXERCISES: CPT | Mod: GP | Performed by: PHYSICAL THERAPIST

## 2019-01-24 ENCOUNTER — OFFICE VISIT (OUTPATIENT)
Dept: DERMATOLOGY | Facility: CLINIC | Age: 49
End: 2019-01-24
Payer: COMMERCIAL

## 2019-01-24 ENCOUNTER — TELEPHONE (OUTPATIENT)
Dept: DERMATOLOGY | Facility: CLINIC | Age: 49
End: 2019-01-24

## 2019-01-24 DIAGNOSIS — L70.0 ACNE VULGARIS: Primary | ICD-10-CM

## 2019-01-24 RX ORDER — SPIRONOLACTONE 25 MG/1
100 TABLET ORAL DAILY
Qty: 360 TABLET | Refills: 3 | Status: SHIPPED | OUTPATIENT
Start: 2019-01-24 | End: 2019-04-25

## 2019-01-24 RX ORDER — TRETINOIN 0.25 MG/G
CREAM TOPICAL
Qty: 45 G | Refills: 11 | Status: SHIPPED | OUTPATIENT
Start: 2019-01-24 | End: 2020-01-24

## 2019-01-24 ASSESSMENT — PAIN SCALES - GENERAL: PAINLEVEL: NO PAIN (0)

## 2019-01-24 NOTE — TELEPHONE ENCOUNTER
Medication: tretinoin 0.025% cream                Other Information:          [facility]  Phone: [phone]  Fax: [phone]

## 2019-01-24 NOTE — LETTER
1/24/2019       RE: Eileen Valladares  [address]  [facility] [address]     Dear Colleague,    Thank you for referring your patient, Eileen Valladares, to the [facility] DERMATOLOGY at [facility]. Please see a copy of my visit note below.        [facility] Dermatology Note      Dermatology Problem List:    [facility] patient of Dr. Rony Ernst  1. Acne vulgaris  - Recommended benzoyl peroxide 5% wash in the shower, tretinoin 0.025% cream nightly, and spironolactone 100 mg every morning  - S/p ILK 5 mg/mL 0.2 ml to two lesions of left posterior cheek  - Return to clinic around April 2019 for re-assessment    Encounter Date: Jan 24, 2019    CC:   Chief Complaint   Patient presents with     Derm Problem     Eileen is here for cyct under her ear and acne.         History of Present Illness:  Ms. Eileen Valladares is a 48 year old female who presents for evaluation of acne.  She says that she has had acne over the last 20 years.  She currently uses a variety of over-the-counter acne products and a benzyl peroxide spot treatment.  She denies any acne of the chest or back, and says her acne is mostly of the lower face.  She is on a Mirena IUD.  She denies ever taking isotretinoin.  She also reports to cystic lesions of the left posterior cheek that she says is wax and wane in size and inflammation over the last few months.  She intermittently has attempted to marie and express fluid from these lesions.  She says only bloody discharge is expressed.  She denies any constitutional symptoms.    Past Medical History:   Patient Active Problem List   Diagnosis     GRANULOMA LUNG     CYST KIDNEY     Generalized anxiety disorder     Hyperlipidemia with target LDL less than 130     Temporomandibular joint disorder     Obesity, Class I, BMI 30-34.9     Shoulder pain, right     Moderate episode of recurrent major depressive disorder (H)     Past Medical History:    Diagnosis Date     Anxiety, generalized     controlled with sertraline     Depressive disorder 2016    Going through divorce     Family history of other specified malignant neoplasm     Mom with primary lung CA and primary renal cell CA     Family history of other specified malignant neoplasm      Female infertility of unspecified origin     Clomid to conceive 2 sons     Fibrocystic Breast Disease      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 she has never used smokeless tobacco. She reports that she drinks about 0.6 oz of alcohol per week. She reports that she does not use drugs.    Family History:  Family History   Problem Relation Age of Onset     Hypertension Mother      Other Cancer Mother         Lung () & Kidney ()     Depression Mother      C.A.D. Father          age 64 of  ischemic bowel     Colon Polyps Father         benign     Diabetes Father          at age 64     Hypertension Father         also high cholesterol     Hyperlipidemia Father      Cerebrovascular Disease Father         ischemic strokes, questionable alzheimers, first cva at 58, first MI in his 40s, 2 CABG procedures for MIs     Cancer Paternal Grandmother      Cardiovascular Paternal Uncle         stroke in his 50s     Diabetes Maternal Grandmother          at age 76     Hypertension Maternal Grandmother      Diabetes Maternal Grandfather         diet at age 76     Hypertension Maternal Grandfather      Anxiety Disorder Sister        Medications:  Current Outpatient Medications    Medication Sig Dispense Refill     benzoyl peroxide 5 % external liquid Use daily as directed as a face wash and rinse off completely to avoid bleaching towels 226 g 11     buPROPion (WELLBUTRIN XL) 300 MG 24 hr tablet Take 1 tablet (300 mg) by mouth every morning 90 tablet 1     CALCIUM + D 600-200 MG-UNIT OR TABS 2 TABLET DAILY       Cholecalciferol (VITAMIN D) 2000 UNITS tablet Take 2,000 Units by mouth daily 100 tablet 3     MULTI-VITAMIN OR TABS        spironolactone (ALDACTONE) 25 MG tablet Take 4 tablets (100 mg) by mouth daily In the morning 360 tablet 3     tretinoin (RETIN-A) 0.025 % external cream Use every night and apply a PEA-sized amount to the face and wear sunscreen in the morning 45 g 11     ciprofloxacin (CIPRO) 500 MG tablet Take 1 tablet (500 mg) by mouth 2 times daily (Patient not taking: Reported on 1/24/2019) 14 tablet 0     clotrimazole (LOTRIMIN) 1 % external cream Apply topically 2 times daily (Patient not taking: Reported on 1/24/2019) 15 g 1     phenazopyridine (AZO) 97.5 MG tablet Take 2 tablets (195 mg) by mouth 3 times daily as needed for urinary tract discomfort (Patient not taking: Reported on 1/24/2019) 12 tablet 0        Allergies   Allergen Reactions     No Known Drug Allergies          Review of Systems:  -Skin/Heme New Pt: The patient denies frequent sun exposure. The patient denies excessive scarring or problems healing except as per HPI. The patient denies excessive bleeding.  -Constitutional: Otherwise feeling well today, in usual state of health.  -HEENT: Patient denies nonhealing oral sores.  -Skin: As above in HPI. No additional skin concerns.    Physical exam:  Vitals: There were no vitals taken for this visit.  GEN: This is a well developed, well-nourished female in no acute distress, in a pleasant mood.    SKIN: Waist-up skin, which includes the head/face, neck, both arms, chest, back, abdomen, digits and/or nails was examined.  -There are superifical acneiform  papules with intermixed open and closed comedones on the face, predominantly of the lower face, with the presence of scarring.  - At the left posterior cheek, there are 2 roughly 8 mm inflammatory nodulopapules with induration.  -No other lesions of concern on areas examined.     Impression/Plan:  1. Acne vulgaris    She has moderate disease today with the presence of many acneiform papules and comedones of the face, with relative sparing of the chest and back.  She also has 2 inflamed nodulocystic acne papules of the left posterior cheek.  We reviewed the options for these 2 lesions, including surgical excision versus injection with intralesional steroid.  The patient elected ILK injection.  We may consider surgical excision at a later time based on response.    Kenalog intralesional injection procedure note: After verbal consent and discussion of risks including but not limited to atrophy, pain, and bruising, time out was performed, the patient underwent positioning and the area was prepped with isopropyl alcohol, 0.2 total cc of Kenalog 5 mg/cc was injected into 2 site(s) on the left posterior cheek. The patient tolerated the procedure well and left the Dermatology clinic in good condition.    Recommended benzoyl peroxide 5% wash in the shower, tretinoin 0.025% cream nightly, and spironolactone 100 mg every morning    We reviewed that benzoyl peroxide can cause bleaching of towels and dark linens and should be rinsed off completely before trialing down.  We reviewed tretinoin should be used only with a pea-sized amount to avoid undue irritation.  We reviewed spironolactone can cause hypokalemia and increased urination.  We note that the patient has had recent lab work in December 2018, including a BMP, which was WNL at that time.  It is reasonable to defer repeat testing today.    Follow-up in 3 months, earlier for new or changing lesions.     Dr. Russell staffed the patient.    Staff Involved:  Resident(Dr. Uribe  Klever)/Staff    Staff Physician Comments:   I saw and evaluated the patient with the resident and I edited the assessment and plan as documented in the note. I was present for the entire minor procedure and examination.    MD Sergio Haas MD

## 2019-01-24 NOTE — PROGRESS NOTES
[facility] Dermatology Note      Dermatology Problem List:    [facility] patient of Dr. Rony Ernst  1. Acne vulgaris  - Recommended benzoyl peroxide 5% wash in the shower, tretinoin 0.025% cream nightly, and spironolactone 100 mg every morning  - S/p ILK 5 mg/mL 0.2 ml to two lesions of left posterior cheek  - Return to clinic around April 2019 for re-assessment    Encounter Date: Jan 24, 2019    CC:   Chief Complaint   Patient presents with     Derm Problem     Eileen is here for cyct under her ear and acne.         History of Present Illness:  Ms. Eileen Valladares is a 48 year old female who presents for evaluation of acne.  She says that she has had acne over the last 20 years.  She currently uses a variety of over-the-counter acne products and a benzyl peroxide spot treatment.  She denies any acne of the chest or back, and says her acne is mostly of the lower face.  She is on a Mirena IUD.  She denies ever taking isotretinoin.  She also reports to cystic lesions of the left posterior cheek that she says is wax and wane in size and inflammation over the last few months.  She intermittently has attempted to marie and express fluid from these lesions.  She says only bloody discharge is expressed.  She denies any constitutional symptoms.    Past Medical History:   Patient Active Problem List   Diagnosis     GRANULOMA LUNG     CYST KIDNEY     Generalized anxiety disorder     Hyperlipidemia with target LDL less than 130     Temporomandibular joint disorder     Obesity, Class I, BMI 30-34.9     Shoulder pain, right     Moderate episode of recurrent major depressive disorder (H)     Past Medical History:   Diagnosis Date     Anxiety, generalized 2001    controlled with sertraline     Depressive disorder 11/1/2016    Going through divorce     Family history of other specified malignant neoplasm     Mom with primary lung CA and primary renal cell CA     Family history of other specified  malignant neoplasm      Female infertility of unspecified origin     Clomid to conceive 2 sons     Fibrocystic Breast Disease      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 she has never used smokeless tobacco. She reports that she drinks about 0.6 oz of alcohol per week. She reports that she does not use drugs.    Family History:  Family History   Problem Relation Age of Onset     Hypertension Mother      Other Cancer Mother         Lung () & Kidney ()     Depression Mother      C.A.D. Father          age 64 of  ischemic bowel     Colon Polyps Father         benign     Diabetes Father          at age 64     Hypertension Father         also high cholesterol     Hyperlipidemia Father      Cerebrovascular Disease Father         ischemic strokes, questionable alzheimers, first cva at 58, first MI in his 40s, 2 CABG procedures for MIs     Cancer Paternal Grandmother      Cardiovascular Paternal Uncle         stroke in his 50s     Diabetes Maternal Grandmother          at age 76     Hypertension Maternal Grandmother      Diabetes Maternal Grandfather         diet at age 76     Hypertension Maternal Grandfather      Anxiety Disorder Sister        Medications:  Current Outpatient Medications   Medication Sig Dispense Refill     benzoyl peroxide 5 % external liquid Use daily as directed as a face wash and rinse off completely to avoid bleaching towels 226 g 11     buPROPion (WELLBUTRIN XL) 300 MG 24 hr tablet Take 1 tablet (300 mg) by mouth every morning 90 tablet 1     CALCIUM + D  600-200 MG-UNIT OR TABS 2 TABLET DAILY       Cholecalciferol (VITAMIN D) 2000 UNITS tablet Take 2,000 Units by mouth daily 100 tablet 3     MULTI-VITAMIN OR TABS        spironolactone (ALDACTONE) 25 MG tablet Take 4 tablets (100 mg) by mouth daily In the morning 360 tablet 3     tretinoin (RETIN-A) 0.025 % external cream Use every night and apply a PEA-sized amount to the face and wear sunscreen in the morning 45 g 11     ciprofloxacin (CIPRO) 500 MG tablet Take 1 tablet (500 mg) by mouth 2 times daily (Patient not taking: Reported on 1/24/2019) 14 tablet 0     clotrimazole (LOTRIMIN) 1 % external cream Apply topically 2 times daily (Patient not taking: Reported on 1/24/2019) 15 g 1     phenazopyridine (AZO) 97.5 MG tablet Take 2 tablets (195 mg) by mouth 3 times daily as needed for urinary tract discomfort (Patient not taking: Reported on 1/24/2019) 12 tablet 0        Allergies   Allergen Reactions     No Known Drug Allergies          Review of Systems:  -Skin/Heme New Pt: The patient denies frequent sun exposure. The patient denies excessive scarring or problems healing except as per HPI. The patient denies excessive bleeding.  -Constitutional: Otherwise feeling well today, in usual state of health.  -HEENT: Patient denies nonhealing oral sores.  -Skin: As above in HPI. No additional skin concerns.    Physical exam:  Vitals: There were no vitals taken for this visit.  GEN: This is a well developed, well-nourished female in no acute distress, in a pleasant mood.    SKIN: Waist-up skin, which includes the head/face, neck, both arms, chest, back, abdomen, digits and/or nails was examined.  -There are superifical acneiform papules with intermixed open and closed comedones on the face, predominantly of the lower face, with the presence of scarring.  - At the left posterior cheek, there are 2 roughly 8 mm inflammatory nodulopapules with induration.  -No other lesions of concern on areas examined.      Impression/Plan:  1. Acne vulgaris    She has moderate disease today with the presence of many acneiform papules and comedones of the face, with relative sparing of the chest and back.  She also has 2 inflamed nodulocystic acne papules of the left posterior cheek.  We reviewed the options for these 2 lesions, including surgical excision versus injection with intralesional steroid.  The patient elected ILK injection.  We may consider surgical excision at a later time based on response.    Kenalog intralesional injection procedure note: After verbal consent and discussion of risks including but not limited to atrophy, pain, and bruising, time out was performed, the patient underwent positioning and the area was prepped with isopropyl alcohol, 0.2 total cc of Kenalog 5 mg/cc was injected into 2 site(s) on the left posterior cheek. The patient tolerated the procedure well and left the Dermatology clinic in good condition.    Recommended benzoyl peroxide 5% wash in the shower, tretinoin 0.025% cream nightly, and spironolactone 100 mg every morning    We reviewed that benzoyl peroxide can cause bleaching of towels and dark linens and should be rinsed off completely before trialing down.  We reviewed tretinoin should be used only with a pea-sized amount to avoid undue irritation.  We reviewed spironolactone can cause hypokalemia and increased urination.  We note that the patient has had recent lab work in December 2018, including a BMP, which was WNL at that time.  It is reasonable to defer repeat testing today.    Follow-up in 3 months, earlier for new or changing lesions.     Dr. Russell staffed the patient.    Staff Involved:  Resident(Dr. Rony Ernst)/Staff    Staff Physician Comments:   I saw and evaluated the patient with the resident and I edited the assessment and plan as documented in the note. I was present for the entire minor procedure and examination.    Jed Russell MD   of  Dermatology  Department of Dermatology  [facility]

## 2019-01-24 NOTE — PATIENT INSTRUCTIONS
Preventive Care:    Colorectal Cancer Screening: During our visit today, we discussed that it is recommended you receive colorectal cancer screening. Please call or make an appointment with your primary care provider to discuss this. You may also call the  [employer] scheduling line ([phone]) to set up a colonoscopy appointment.    For your acne, we will start you on a regimen including benzoyl peroxide face wash, topical tretinoin and an oral medication called spironolactone.    We reviewed the options for the two bumps on the left side of your face underneath your left ear, including injecting with a steroid versus a surgical procedure. We performed the injection today and will see you back in 3 months to re-assess the spots.

## 2019-01-24 NOTE — NURSING NOTE
Dermatology Rooming Note    Eileen Valladares's goals for this visit include:   Chief Complaint   Patient presents with     Derm Problem     Eileen is here for cyct under her ear and acne.     Nayeli Rosen, CMA

## 2019-01-24 NOTE — NURSING NOTE
Drug Administration Record    Prior to injection, verified patient identity using patient's name and date of birth.  Due to injection administration, patient instructed to remain in clinic for 15 minutes  afterwards, and to report any adverse reaction to me immediately.    Drug Name: triamcinolone acetonide(kenalog)  Dose: 0.2 mL of triamcinolone 10mg/mL, 20 mg dose  Route administered: ID  NDC #: [id number]: Kenalog-10 (3380-7443-65) or Lidocaine (7269-7483-54)  Amount of waste(mL):4.8mL  Reason for waste: Multi dose vial    LOT #: [id number]  SITE: body  : [employer]  EXPIRATION DATE: 07/2020

## 2019-01-25 ENCOUNTER — THERAPY VISIT (OUTPATIENT)
Dept: PHYSICAL THERAPY | Facility: CLINIC | Age: 49
End: 2019-01-25
Payer: COMMERCIAL

## 2019-01-25 DIAGNOSIS — M25.511 SHOULDER PAIN, RIGHT: ICD-10-CM

## 2019-01-25 PROCEDURE — 97112 NEUROMUSCULAR REEDUCATION: CPT | Mod: GP | Performed by: PHYSICAL THERAPIST

## 2019-01-25 PROCEDURE — 97110 THERAPEUTIC EXERCISES: CPT | Mod: GP | Performed by: PHYSICAL THERAPIST

## 2019-01-25 PROCEDURE — 97140 MANUAL THERAPY 1/> REGIONS: CPT | Mod: GP | Performed by: PHYSICAL THERAPIST

## 2019-01-28 NOTE — TELEPHONE ENCOUNTER
Central Prior Authorization Team   Phone: [phone]    PA Initiation    Medication: tretinoin 0.025% cream  Insurance Company: [employer] - Phone [phone] Fax [phone]  Pharmacy Filling the Rx: [facility] - [place], [place] - [facility] [address]  Filling Pharmacy Phone: [phone]  Filling Pharmacy Fax:    Start Date: 1/28/2019

## 2019-01-29 NOTE — TELEPHONE ENCOUNTER
Prior Authorization Approval    Authorization Effective Date: 1/28/2019  Authorization Expiration Date: 1/28/2020  Medication: tretinoin 0.025% cream - approved  Approved Dose/Quantity:   Reference #: [id number]   Insurance Company: [employer] - Phone [phone] Fax [phone]  Expected CoPay: n/a     CoPay Card Available:      Foundation Assistance Needed:    Which Pharmacy is filling the prescription (Not needed for infusion/clinic administered): [facility] - [place], [place] - [address]  Pharmacy Notified: Yes  Patient Notified: No

## 2019-02-14 ENCOUNTER — THERAPY VISIT (OUTPATIENT)
Dept: PHYSICAL THERAPY | Facility: CLINIC | Age: 49
End: 2019-02-14
Payer: COMMERCIAL

## 2019-02-14 DIAGNOSIS — M25.511 SHOULDER PAIN, RIGHT: ICD-10-CM

## 2019-02-14 PROCEDURE — 97110 THERAPEUTIC EXERCISES: CPT | Mod: GP | Performed by: PHYSICAL THERAPIST

## 2019-02-14 PROCEDURE — 97140 MANUAL THERAPY 1/> REGIONS: CPT | Mod: GP | Performed by: PHYSICAL THERAPIST

## 2019-03-28 NOTE — PROGRESS NOTES
SUBJECTIVE:   Eileen Valladares is a 48 year old female who presents to clinic today for the following health issues:    Answers for HPI/ROS submitted by the patient on 4/2/2019   If you checked off any problems, how difficult have these problems made it for you to do your work, take care of things at home, or get along with other people?: Not difficult at all  PHQ9 TOTAL SCORE: 6  MARIAN 7 TOTAL SCORE: 5    HPI  Concern - Ear discomfort  Onset: a few months     Description:   Has been having some ear discomfort in both ears for a few months after flying. People around her suggest she isn't hearing as well as usual also.      Intensity: moderate    Progression of Symptoms:  same    Therapies Tried and outcome: none    States symptoms started as noted above. She is c/o bilateral hearing decreased. She states she wears ear buds at times. She denies pain, sinus pressure or drainage. Denies tinnitus.     Problem list and histories reviewed & adjusted, as indicated.  Additional history: as documented    Patient Active Problem List   Diagnosis     GRANULOMA LUNG     CYST KIDNEY     Generalized anxiety disorder     Hyperlipidemia with target LDL less than 130     Temporomandibular joint disorder     Obesity, Class I, BMI 30-34.9     Shoulder pain, right     Moderate episode of recurrent major depressive disorder (H)     Past Surgical History:   Procedure Laterality Date     BIOPSY Bilateral Breast 2001    benign     COLONOSCOPY  12/02     negative     DILATION AND CURETTAGE  1997    miscarriage     TONSILLECTOMY & ADENOIDECTOMY      as a child       Social History     Tobacco Use     Smoking status: Never Smoker     Smokeless tobacco: Never Used   Substance Use Topics     Alcohol use: Yes     Alcohol/week: 0.6 oz     Types: 1 Standard drinks or equivalent per week     Comment: 1 drink per week     Family History   Problem Relation Age of Onset     Hypertension Mother      Other Cancer Mother         Lung (2001) & Kidney  ()     Depression Mother      C.A.D. Father          age 64 of  ischemic bowel     Colon Polyps Father         benign     Diabetes Father          at age 64     Hypertension Father         also high cholesterol     Hyperlipidemia Father      Cerebrovascular Disease Father         ischemic strokes, questionable alzheimers, first cva at 58, first MI in his 40s, 2 CABG procedures for MIs     Cancer Paternal Grandmother      Cardiovascular Paternal Uncle         stroke in his 50s     Diabetes Maternal Grandmother          at age 76     Hypertension Maternal Grandmother      Diabetes Maternal Grandfather         diet at age 76     Hypertension Maternal Grandfather      Anxiety Disorder Sister          Current Outpatient Medications   Medication Sig Dispense Refill     benzoyl peroxide 5 % external liquid Use daily as directed as a face wash and rinse off completely to avoid bleaching towels 226 g 11     buPROPion (WELLBUTRIN XL) 300 MG 24 hr tablet Take 1 tablet (300 mg) by mouth every morning 90 tablet 1     CALCIUM + D 600-200 MG-UNIT OR TABS 2 TABLET DAILY       Cholecalciferol (VITAMIN D) 2000 UNITS tablet Take 2,000 Units by mouth daily 100 tablet 3     MULTI-VITAMIN OR TABS        spironolactone (ALDACTONE) 25 MG tablet Take 4 tablets (100 mg) by mouth daily In the morning 360 tablet 3     tretinoin (RETIN-A) 0.025 % external cream Use every night and apply a PEA-sized amount to the face and wear sunscreen in the morning 45 g 11     Allergies   Allergen Reactions     No Known Drug Allergies      BP Readings from Last 3 Encounters:   19 116/72   18 120/78   18 110/86    Wt Readings from Last 3 Encounters:   19 73.5 kg (162 lb)   18 77.2 kg (170 lb 3.2 oz)   18 76.2 kg (168 lb)                  Labs reviewed in EPIC    ROS:  Constitutional, HEENT, cardiovascular, pulmonary, GI, , musculoskeletal, neuro, skin, endocrine and psych systems are negative, except as  "otherwise noted.    OBJECTIVE:     /72   Pulse 90   Temp 98.4  F (36.9  C) (Temporal)   Resp 16   Ht 1.705 m (5' 7.13\")   Wt 73.5 kg (162 lb)   SpO2 100%   BMI 25.28 kg/m    Body mass index is 25.28 kg/m .  GENERAL: healthy, alert and no distress  EYES: Eyes grossly normal to inspection, PERRL and conjunctivae and sclerae normal  HENT: normal cephalic/atraumatic, right ear: occluded with wax and right ear lavage completed by the medical assistant with warm water, tympanic membranes visualized using otoscope and were pearly grey, no erythema.   , left ear: normal: no effusions, no erythema, normal landmarks, nose and mouth without ulcers or lesions, oropharynx clear and oral mucous membranes moist  NECK: no adenopathy, no asymmetry, masses, or scars and thyroid normal to palpation  RESP: lungs clear to auscultation - no rales, rhonchi or wheezes  CV: regular rate and rhythm, normal S1 S2, no S3 or S4, no murmur, click or rub, no peripheral edema and peripheral pulses strong  PSYCH: mentation appears normal, affect normal/bright    ASSESSMENT/PLAN:     1. Impacted cerumen of right ear  Bilateral ear normal in appearance reviewed home cleaning using Debrox or glycerin oil.   - REMOVE IMPACTED CERUMEN    2. Hearing decreased, bilateral  Normal hearing test discussed if symptoms continue recommend antihistamine Zyrtec 10 mg daily also with future flying. Recommend ENT if symptoms worsen or do not improve  - REMOVE IMPACTED CERUMEN  - COMPREHENSIVE HEARING TEST    3. At risk for ineffective health maintenance  Discussed HM over due for multiple including PAP she is going to schedule with her PCP      Home care instructions were reviewed with the patient. The risks, benefits and treatment options of prescribed medications or other treatments have been discussed with the patient. The patient verbalized their understanding and should call or follow up if no improvement or if they develop further " problems.      SUSANNAH Chinchilla [facility]

## 2019-04-02 ASSESSMENT — ANXIETY QUESTIONNAIRES
4. TROUBLE RELAXING: NOT AT ALL
3. WORRYING TOO MUCH ABOUT DIFFERENT THINGS: SEVERAL DAYS
7. FEELING AFRAID AS IF SOMETHING AWFUL MIGHT HAPPEN: SEVERAL DAYS
1. FEELING NERVOUS, ANXIOUS, OR ON EDGE: SEVERAL DAYS
GAD7 TOTAL SCORE: 5
GAD7 TOTAL SCORE: 5
7. FEELING AFRAID AS IF SOMETHING AWFUL MIGHT HAPPEN: SEVERAL DAYS
2. NOT BEING ABLE TO STOP OR CONTROL WORRYING: SEVERAL DAYS
5. BEING SO RESTLESS THAT IT IS HARD TO SIT STILL: NOT AT ALL
GAD7 TOTAL SCORE: 5
6. BECOMING EASILY ANNOYED OR IRRITABLE: SEVERAL DAYS

## 2019-04-02 ASSESSMENT — PATIENT HEALTH QUESTIONNAIRE - PHQ9
SUM OF ALL RESPONSES TO PHQ QUESTIONS 1-9: 6
SUM OF ALL RESPONSES TO PHQ QUESTIONS 1-9: 6
10. IF YOU CHECKED OFF ANY PROBLEMS, HOW DIFFICULT HAVE THESE PROBLEMS MADE IT FOR YOU TO DO YOUR WORK, TAKE CARE OF THINGS AT HOME, OR GET ALONG WITH OTHER PEOPLE: NOT DIFFICULT AT ALL

## 2019-04-03 ENCOUNTER — OFFICE VISIT (OUTPATIENT)
Dept: FAMILY MEDICINE | Facility: OTHER | Age: 49
End: 2019-04-03
Payer: COMMERCIAL

## 2019-04-03 VITALS
TEMPERATURE: 98.4 F | HEIGHT: 67 IN | HEART RATE: 90 BPM | DIASTOLIC BLOOD PRESSURE: 72 MMHG | WEIGHT: 162 LBS | OXYGEN SATURATION: 100 % | BODY MASS INDEX: 25.43 KG/M2 | SYSTOLIC BLOOD PRESSURE: 116 MMHG | RESPIRATION RATE: 16 BRPM

## 2019-04-03 DIAGNOSIS — Z91.89 AT RISK FOR INEFFECTIVE HEALTH MAINTENANCE: ICD-10-CM

## 2019-04-03 DIAGNOSIS — H61.21 IMPACTED CERUMEN OF RIGHT EAR: Primary | ICD-10-CM

## 2019-04-03 DIAGNOSIS — H91.93 HEARING DECREASED, BILATERAL: ICD-10-CM

## 2019-04-03 PROCEDURE — 92557 COMPREHENSIVE HEARING TEST: CPT | Performed by: NURSE PRACTITIONER

## 2019-04-03 PROCEDURE — 69209 REMOVE IMPACTED EAR WAX UNI: CPT | Mod: RT | Performed by: NURSE PRACTITIONER

## 2019-04-03 PROCEDURE — 99213 OFFICE O/P EST LOW 20 MIN: CPT | Mod: 25 | Performed by: NURSE PRACTITIONER

## 2019-04-03 ASSESSMENT — MIFFLIN-ST. JEOR: SCORE: 1389.46

## 2019-04-03 ASSESSMENT — ANXIETY QUESTIONNAIRES: GAD7 TOTAL SCORE: 5

## 2019-04-03 ASSESSMENT — PATIENT HEALTH QUESTIONNAIRE - PHQ9: SUM OF ALL RESPONSES TO PHQ QUESTIONS 1-9: 6

## 2019-04-03 ASSESSMENT — PAIN SCALES - GENERAL: PAINLEVEL: NO PAIN (0)

## 2019-04-04 ENCOUNTER — THERAPY VISIT (OUTPATIENT)
Dept: PHYSICAL THERAPY | Facility: CLINIC | Age: 49
End: 2019-04-04
Payer: COMMERCIAL

## 2019-04-04 DIAGNOSIS — M25.511 SHOULDER PAIN, RIGHT: ICD-10-CM

## 2019-04-04 PROCEDURE — 97110 THERAPEUTIC EXERCISES: CPT | Mod: GP | Performed by: PHYSICAL THERAPIST

## 2019-04-04 PROCEDURE — 97112 NEUROMUSCULAR REEDUCATION: CPT | Mod: GP | Performed by: PHYSICAL THERAPIST

## 2019-04-08 NOTE — PROGRESS NOTES
Subjective:  HPI                    Objective:  System    Physical Exam    General     ROS    Assessment/Plan:    PROGRESS  REPORT    Progress reporting period is from 12/19/19 to 4/7/19.     SUBJECTIVE  Pt returns after delay d/t vacation and other obligations. She states that her shoulder is continuing to improve. Exercises are going well.   Current Pain level: 2/10   Initial Pain level: 8/10   Changes in function: (yes, see goal flow sheet)    OBJECTIVE  Shoulder AROM WNL and pain free in all planes  Tender to palpation R deltoid, supraspinatus  Negative HK  Negative Neers    ASSESSMENT/PLAN  Diagnosis 1:  R shoulder pain  Pain -  hot/cold therapy, manual therapy, splint/taping/bracing/orthotics, self management, education and home program  Decreased ROM/flexibility - manual therapy, therapeutic exercise and home program  Decreased strength - therapeutic exercise, therapeutic activities and home program  STG/LTGs have been met or progress has been made towards goals:  Yes (See Goal flow sheet completed today.)  Assessment of Progress: The patient's condition is improving.  The patient's condition has potential to improve.  Self Management Plans:  Patient has been instructed in a home treatment program.  Patient is independent in a home treatment program.  Patient  has been instructed in self management of symptoms.  Patient is independent in self management of symptoms.  I have re-evaluated this patient and find that the nature, scope, duration and intensity of the therapy is appropriate for the medical condition of the patient.  Eileen continues to require the following intervention to meet STG and LTG's: PT    Recommendations:  This patient would benefit from continued therapy.     Frequency:  1 X a month, once daily  Duration:  for 2 months        Please refer to the daily flowsheet for treatment today, total treatment time and time spent performing 1:1 timed codes.

## 2019-04-25 ENCOUNTER — OFFICE VISIT (OUTPATIENT)
Dept: DERMATOLOGY | Facility: CLINIC | Age: 49
End: 2019-04-25
Payer: COMMERCIAL

## 2019-04-25 DIAGNOSIS — L70.0 ACNE VULGARIS: ICD-10-CM

## 2019-04-25 DIAGNOSIS — D18.00 HEMANGIOMA: ICD-10-CM

## 2019-04-25 DIAGNOSIS — D18.01 CHERRY ANGIOMA: Primary | ICD-10-CM

## 2019-04-25 DIAGNOSIS — D22.9 MULTIPLE BENIGN NEVI: ICD-10-CM

## 2019-04-25 DIAGNOSIS — D23.72 DERMATOFIBROMA OF LEFT LOWER EXTREMITY: ICD-10-CM

## 2019-04-25 LAB
ANION GAP SERPL CALCULATED.3IONS-SCNC: 5 MMOL/L (ref 3–14)
BUN SERPL-MCNC: 13 MG/DL (ref 7–30)
CALCIUM SERPL-MCNC: 9.2 MG/DL (ref 8.5–10.1)
CHLORIDE SERPL-SCNC: 105 MMOL/L (ref 94–109)
CO2 SERPL-SCNC: 27 MMOL/L (ref 20–32)
CREAT SERPL-MCNC: 0.67 MG/DL (ref 0.52–1.04)
GFR SERPL CREATININE-BSD FRML MDRD: >90 ML/MIN/{1.73_M2}
GLUCOSE SERPL-MCNC: 87 MG/DL (ref 70–99)
POTASSIUM SERPL-SCNC: 4 MMOL/L (ref 3.4–5.3)
SODIUM SERPL-SCNC: 137 MMOL/L (ref 133–144)

## 2019-04-25 RX ORDER — SPIRONOLACTONE 100 MG/1
100 TABLET, FILM COATED ORAL DAILY
Qty: 90 TABLET | Refills: 4 | Status: SHIPPED | OUTPATIENT
Start: 2019-04-25 | End: 2020-06-18

## 2019-04-25 ASSESSMENT — PAIN SCALES - GENERAL: PAINLEVEL: NO PAIN (0)

## 2019-04-25 NOTE — LETTER
4/25/2019       RE: Eileen Valladares  [address]  [facility] [address]     Dear Colleague,    Thank you for referring your patient, Eileen Valladares, to the [facility] DERMATOLOGY at [facility]. Please see a copy of my visit note below.    [facility] Dermatology Note      Dermatology Problem List:    [facility] patient of Dr. Rony Ernst  1. Acne vulgaris  - Current treatment: spironolactone 100 mg every morning only, per patient preference  - Prior treatment: benzoyl peroxide 5% wash in the shower, tretinoin 0.025% cream nightly - discontinued after excess dryness  - S/p ILK 5 mg/mL 0.2 ml to two lesions of left posterior cheek on 1/24/2019  2. Benign nevi, cherry angiomas, dermatofibroma of left thigh  3. Hemangioma of sacrum, resolved with residual vascular stain    Encounter Date: Apr 25, 2019    CC:   Chief Complaint   Patient presents with     Skin Check     Eileen is here fro skin check and follow up acne.         History of Present Illness:  Ms. Eileen Valladares is a 48 year old female who presents as a follow-up for acne vulgaris. The patient was last seen on 1/24/2019, when she was diagnosed with acne vulgaris and prescribed benzoyl peroxide was and tretinoin 0.025% cream, as well as spironolactone 100 mg qdaily. She says the topical medications led to excess dryness, so she discontinued their use after a few weeks. She has noticed improvement in her acne with use of spironolactone only. She would like to avoid topical use at this time. She would also like a skin cancer screening today.      The patient says they have not seen a dermatologist before for a skin cancer screening. The patient is not particularly concerned about any spots today, as well as denies any other spots of concern, including any that are burning, bleeding, tingling or pruritic.     The patient does not have a history of frequent blistering sunburns as a child and did  not visit tanning salons or otherwise intentionally tan when the patient were younger. There is no personal history of skin cancer. There is no family history of skin cancer. The patient denies being on immunosuppressive medications that could increase the risk of skin cancer. The patient does try to use sunscreen regularly when outside and does attempt to stay out of the sun whenever possible.    Past Medical History:   Patient Active Problem List   Diagnosis     GRANULOMA LUNG     CYST KIDNEY     Generalized anxiety disorder     Hyperlipidemia with target LDL less than 130     Temporomandibular joint disorder     Obesity, Class I, BMI 30-34.9     Shoulder pain, right     Moderate episode of recurrent major depressive disorder (H)     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 She has never used smokeless tobacco. She reports that she drinks about 0.6 oz of alcohol per week. She reports that she does not use drugs.    Family History:  Family History   Problem Relation Age of Onset     Hypertension Mother      Other Cancer Mother         Lung (2001) & Kidney  ()     Depression Mother      C.A.D. Father          age 64 of  ischemic bowel     Colon Polyps Father         benign     Diabetes Father          at age 64     Hypertension Father         also high cholesterol     Hyperlipidemia Father      Cerebrovascular Disease Father         ischemic strokes, questionable alzheimers, first cva at 58, first MI in his 40s, 2 CABG procedures for MIs     Cancer Paternal Grandmother      Cardiovascular Paternal Uncle         stroke in his 50s     Diabetes Maternal Grandmother          at age 76     Hypertension Maternal Grandmother      Diabetes Maternal Grandfather         diet at age 76     Hypertension Maternal Grandfather      Anxiety Disorder Sister        Medications:  Current Outpatient Medications   Medication Sig Dispense Refill     buPROPion (WELLBUTRIN XL) 300 MG 24 hr tablet Take 1 tablet (300 mg) by mouth every morning 90 tablet 1     CALCIUM + D 600-200 MG-UNIT OR TABS 2 TABLET DAILY       Cholecalciferol (VITAMIN D) 2000 UNITS tablet Take 2,000 Units by mouth daily 100 tablet 3     MULTI-VITAMIN OR TABS        spironolactone (ALDACTONE) 25 MG tablet Take 4 tablets (100 mg) by mouth daily In the morning 360 tablet 3     benzoyl peroxide 5 % external liquid Use daily as directed as a face wash and rinse off completely to avoid bleaching towels (Patient not taking: Reported on 2019) 226 g 11     tretinoin (RETIN-A) 0.025 % external cream Use every night and apply a PEA-sized amount to the face and wear sunscreen in the morning (Patient not taking: Reported on 2019) 45 g 11        Allergies   Allergen Reactions     No Known Drug Allergies          Review of Systems:  -Skin Establ Pt: The patient denies any new rash, pruritus, or lesions that are symptomatic, changing or bleeding, except as per HPI.  -Constitutional: Otherwise feeling well today, in usual state of health.  -HEENT: Patient denies nonhealing oral sores.  -Skin: As above in HPI. No  additional skin concerns.    Physical exam:  Vitals: There were no vitals taken for this visit.  GEN: This is a well developed, well-nourished female in no acute distress, in a pleasant mood.    SKIN: Total skin excluding the undergarment areas was performed. The exam included the head/face, neck, both arms, chest, back, abdomen, both legs, digits and/or nails.   - No acneiform lesions visualized of the face, chest or back  - There is a flesh colored papule that dimples with lateral pressure of the left anteromedial thigh.  - At the sacrum, there is a well demarcated roughly 3 cm blanching pink to red patch.  -There are dome shaped bright red papules on the trunk and upper extremities. .  -Multiple regular brown pigmented macules and papules with uniform pigment networks are identified on the trunk and extremities.  -No other lesions of concern on areas examined.     Impression/Plan:  1. Acne vulgaris    This is improving with spironolactone only because she discontinued benzoyl peroxide and tretinoin due to excess dryness. We talked briefly about using benzoyl peroxide only 1-2 times a week and replacing tretinoin with adapalene cream, but she was not interested in this at this time. This could be considered in the future. Given she has no lesions today, it is reasonable to continue with only spironolactone at this time.    Continue with spironolactone 100 mg qdaily    Obtaining potassium level today while on spironolactone    2. Benign lesions of the skin, including cherry angiomas, clinically benign nevi, dermatofibroma of left anteromedial thigh and hemangioma of sacrum    Reassurance provided.    ABCDs of melanoma were discussed and self skin checks were advised. , Sun precaution was advised including the use of sun screens of SPF 30 or higher, sun protective clothing, and avoidance of tanning beds.    It is reasonable to see her every 1-2 years for a recheck given number of melanocytic nevi on the body.    CC  Referred Self, MD  No address on file on close of this encounter.  Follow-up in 1 year, earlier for new or changing lesions.     Dr. Culp staffed the patient.    Staff Involved:  Resident(Dr. Rony Ernst)/Staff  .I, Niyah Culp MD, saw this patient with the resident and agree with the resident s findings and plan of care as documented in the resident s note.    Again, thank you for allowing me to participate in the care of your patient.      Sincerely,    Sergio Ernst MD

## 2019-04-25 NOTE — PROGRESS NOTES
[facility] Dermatology Note      Dermatology Problem List:    [facility] patient of Dr. Rony Ernst  1. Acne vulgaris  - Current treatment: spironolactone 100 mg every morning only, per patient preference  - Prior treatment: benzoyl peroxide 5% wash in the shower, tretinoin 0.025% cream nightly - discontinued after excess dryness  - S/p ILK 5 mg/mL 0.2 ml to two lesions of left posterior cheek on 1/24/2019  2. Benign nevi, cherry angiomas, dermatofibroma of left thigh  3. Hemangioma of sacrum, resolved with residual vascular stain    Encounter Date: Apr 25, 2019    CC:   Chief Complaint   Patient presents with     Skin Check     Eileen is here fro skin check and follow up acne.         History of Present Illness:  Ms. Eileen Valladares is a 48 year old female who presents as a follow-up for acne vulgaris. The patient was last seen on 1/24/2019, when she was diagnosed with acne vulgaris and prescribed benzoyl peroxide was and tretinoin 0.025% cream, as well as spironolactone 100 mg qdaily. She says the topical medications led to excess dryness, so she discontinued their use after a few weeks. She has noticed improvement in her acne with use of spironolactone only. She would like to avoid topical use at this time. She would also like a skin cancer screening today.      The patient says they have not seen a dermatologist before for a skin cancer screening. The patient is not particularly concerned about any spots today, as well as denies any other spots of concern, including any that are burning, bleeding, tingling or pruritic.     The patient does not have a history of frequent blistering sunburns as a child and did not visit tanning salons or otherwise intentionally tan when the patient were younger. There is no personal history of skin cancer. There is no family history of skin cancer. The patient denies being on immunosuppressive medications that could increase the risk of skin cancer. The  patient does try to use sunscreen regularly when outside and does attempt to stay out of the sun whenever possible.    Past Medical History:   Patient Active Problem List   Diagnosis     GRANULOMA LUNG     CYST KIDNEY     Generalized anxiety disorder     Hyperlipidemia with target LDL less than 130     Temporomandibular joint disorder     Obesity, Class I, BMI 30-34.9     Shoulder pain, right     Moderate episode of recurrent major depressive disorder (H)     Past Medical History:   Diagnosis Date     Anxiety, generalized     controlled with sertraline     Depressive disorder 2016    Going through divorce     Family history of other specified malignant neoplasm     Mom with primary lung CA and primary renal cell CA     Family history of other specified malignant neoplasm      Female infertility of unspecified origin     Clomid to conceive 2 sons     Fibrocystic Breast Disease      Loeys-Becca syndrome type 2 2016    TGFRR2 variant positive, no signs or symptoms of the syndrome     Temporomandibular joint disorders, unspecified      Uterine fibroid      Past Surgical History:   Procedure Laterality Date     BIOPSY Bilateral Breast     benign     COLONOSCOPY       negative     DILATION AND CURETTAGE      miscarriage     TONSILLECTOMY & ADENOIDECTOMY      as a child       Social History:  Patient reports that she has never smoked. She has never used smokeless tobacco. She reports that she drinks about 0.6 oz of alcohol per week. She reports that she does not use drugs.    Family History:  Family History   Problem Relation Age of Onset     Hypertension Mother      Other Cancer Mother         Lung () & Kidney ()     Depression Mother      C.A.D. Father          age 64 of  ischemic bowel     Colon Polyps Father         benign     Diabetes Father          at age 64     Hypertension Father         also high cholesterol     Hyperlipidemia Father      Cerebrovascular Disease Father          ischemic strokes, questionable alzheimers, first cva at 58, first MI in his 40s, 2 CABG procedures for MIs     Cancer Paternal Grandmother      Cardiovascular Paternal Uncle         stroke in his 50s     Diabetes Maternal Grandmother          at age 76     Hypertension Maternal Grandmother      Diabetes Maternal Grandfather         diet at age 76     Hypertension Maternal Grandfather      Anxiety Disorder Sister        Medications:  Current Outpatient Medications   Medication Sig Dispense Refill     buPROPion (WELLBUTRIN XL) 300 MG 24 hr tablet Take 1 tablet (300 mg) by mouth every morning 90 tablet 1     CALCIUM + D 600-200 MG-UNIT OR TABS 2 TABLET DAILY       Cholecalciferol (VITAMIN D) 2000 UNITS tablet Take 2,000 Units by mouth daily 100 tablet 3     MULTI-VITAMIN OR TABS        spironolactone (ALDACTONE) 25 MG tablet Take 4 tablets (100 mg) by mouth daily In the morning 360 tablet 3     benzoyl peroxide 5 % external liquid Use daily as directed as a face wash and rinse off completely to avoid bleaching towels (Patient not taking: Reported on 2019) 226 g 11     tretinoin (RETIN-A) 0.025 % external cream Use every night and apply a PEA-sized amount to the face and wear sunscreen in the morning (Patient not taking: Reported on 2019) 45 g 11        Allergies   Allergen Reactions     No Known Drug Allergies          Review of Systems:  -Skin Establ Pt: The patient denies any new rash, pruritus, or lesions that are symptomatic, changing or bleeding, except as per HPI.  -Constitutional: Otherwise feeling well today, in usual state of health.  -HEENT: Patient denies nonhealing oral sores.  -Skin: As above in HPI. No additional skin concerns.    Physical exam:  Vitals: There were no vitals taken for this visit.  GEN: This is a well developed, well-nourished female in no acute distress, in a pleasant mood.    SKIN: Total skin excluding the undergarment areas was performed. The exam included the  head/face, neck, both arms, chest, back, abdomen, both legs, digits and/or nails.   - No acneiform lesions visualized of the face, chest or back  - There is a flesh colored papule that dimples with lateral pressure of the left anteromedial thigh.  - At the sacrum, there is a well demarcated roughly 3 cm blanching pink to red patch.  -There are dome shaped bright red papules on the trunk and upper extremities. .  -Multiple regular brown pigmented macules and papules with uniform pigment networks are identified on the trunk and extremities.  -No other lesions of concern on areas examined.     Impression/Plan:  1. Acne vulgaris    This is improving with spironolactone only because she discontinued benzoyl peroxide and tretinoin due to excess dryness. We talked briefly about using benzoyl peroxide only 1-2 times a week and replacing tretinoin with adapalene cream, but she was not interested in this at this time. This could be considered in the future. Given she has no lesions today, it is reasonable to continue with only spironolactone at this time.    Continue with spironolactone 100 mg qdaily    Obtaining potassium level today while on spironolactone    2. Benign lesions of the skin, including cherry angiomas, clinically benign nevi, dermatofibroma of left anteromedial thigh and hemangioma of sacrum    Reassurance provided.    ABCDs of melanoma were discussed and self skin checks were advised. , Sun precaution was advised including the use of sun screens of SPF 30 or higher, sun protective clothing, and avoidance of tanning beds.    It is reasonable to see her every 1-2 years for a recheck given number of melanocytic nevi on the body.    CC Referred MD Sky  No address on file on close of this encounter.  Follow-up in 1 year, earlier for new or changing lesions.     Dr. Culp staffed the patient.    Staff Involved:  Resident(Dr. Rony Ernst)/Staff  .I, Niyah Culp MD, saw this patient with the resident and  agree with the resident s findings and plan of care as documented in the resident s note.

## 2019-04-25 NOTE — NURSING NOTE
Dermatology Rooming Note    Eileen Valladares's goals for this visit include:   Chief Complaint   Patient presents with     Skin Check     Eileen is here fro skin check and follow up acne.     Nayeli Rosen, CMA

## 2019-04-25 NOTE — PATIENT INSTRUCTIONS
"Preventive Care:    Colorectal Cancer Screening: During our visit today, we discussed that it is recommended you receive colorectal cancer screening. Please call or make an appointment with your primary care provider to discuss this. You may also call the [facility] scheduling line ([phone]) to set up a colonoscopy appointment.    The ABCDEs of Melanoma    Skin cancer can develop anywhere on the skin. Ask someone for help when checking your skin, especially in hard to see places. If you notice a mole different from others, or that changes, enlarges, itches, or bleeds (even if it is small), you should see a dermatologist.          Today, we did not see anything we are concerned about. We do want to obtain a potassium level while you are on spironolactone, which can lead to higher potassium levels.    The spot on the left chest is called a \"seborrheic keratosis,\" which is a benign thickening of the skin. This is not a skin cancer or area of concern. Try to avoid scratching the area.    Please return to the clinic in 1 year for a total body skin check again.  "

## 2019-06-14 ENCOUNTER — THERAPY VISIT (OUTPATIENT)
Dept: PHYSICAL THERAPY | Facility: CLINIC | Age: 49
End: 2019-06-14
Payer: COMMERCIAL

## 2019-06-14 DIAGNOSIS — M25.511 SHOULDER PAIN, RIGHT: ICD-10-CM

## 2019-06-14 PROCEDURE — 97112 NEUROMUSCULAR REEDUCATION: CPT | Mod: GP | Performed by: PHYSICAL THERAPIST

## 2019-06-14 PROCEDURE — 97110 THERAPEUTIC EXERCISES: CPT | Mod: GP | Performed by: PHYSICAL THERAPIST

## 2019-06-14 NOTE — PROGRESS NOTES
[facility] Initial Evaluation  Subjective:  HPI                    Objective:  System    Physical Exam    General     ROS    Assessment/Plan:    PROGRESS  REPORT    Progress reporting period is from 4/7/19 to 6/14/19.     SUBJECTIVE  Pt states that she has no shoulder pain. Exercises are feeling easier. She is back to her normal activities and not avoiding anything because of pain or fear of pain. She is more aware of her body mechanics when lifting or carrying.   Current Pain level: 0/10   Initial Pain level: 8/10   Changes in function: (yes, see goal flow sheet)    OBJECTIVE  Shoulder AROM WNL and pain free in all planes  Repeated scaption x 15; pain free, good quality scapular movement, symmetrical on both sides  No TTP R deltoid, supraspinatus  Negative HK  Negative Neers  Independent in HEP  Good sitting posture without cues        ASSESSMENT/PLAN  Diagnosis 1:  R shoulder pain    Pain -  hot/cold therapy, manual therapy, splint/taping/bracing/orthotics, self management, education and home program  Decreased ROM/flexibility - manual therapy, therapeutic exercise and home program  Decreased strength - therapeutic exercise, therapeutic activities and home program  STG/LTGs have been met or progress has been made towards goals:  Yes (See Goal flow sheet completed today.)  Assessment of Progress: The patient's condition is improving.  The patient's condition has potential to improve.  The patient has met all of their long term goals.  Self Management Plans:  Patient has been instructed in a home treatment program.  Patient is independent in a home treatment program.  Patient  has been instructed in self management of symptoms.  Patient is independent in self management of symptoms.  I have re-evaluated this patient and find that the nature, scope, duration and intensity of the therapy is appropriate for the medical condition of the patient.  Eileen continues to require the following  intervention to meet STG and LTG's: PT    Recommendations:  This patient would benefit from continued therapy.     Frequency:  1 X a month, once daily  Duration:  for 1 months        Please refer to the daily flowsheet for treatment today, total treatment time and time spent performing 1:1 timed codes.

## 2019-08-20 ENCOUNTER — TELEPHONE (OUTPATIENT)
Dept: ANTICOAGULATION | Facility: OTHER | Age: 49
End: 2019-08-20

## 2019-08-20 NOTE — LETTER
[facility]  [id number] [place] [id number]  Phone: [phone]  August 20, 2019      Eileen Valladares  [address]  [facility] [address]      Dear Eileen,    We care about your health and have reviewed your health plan including your medical conditions, medications, and lab results.  Based on this review, it is recommended that you follow up regarding the following health topic(s):  -Colon Cancer Screening  -Cervical Cancer Screening  -Wellness (Physical) Visit     We recommend you take the following action(s):  -schedule a WELLNESS (Physical) APPOINTMENT.  We will perform the following labs: Lipids (fasting cholesterol - nothing to eat except water and/or meds for 8-10 hours).  -schedule a COLONOSCOPY to look for colon cancer (due every 10 years or 5 years in higher risk situations.)  Colonoscopies can prevent 90-95% of colon cancer deaths.  Problem lesions can be removed before they ever become cancer.  If you do not wish to do a colonoscopy or cannot afford to do one at this time, there is another option called a Fecal Immunochemical Occult Blood Test (FIT) a take home stool sample kit.  It does not replace the colonoscopy for colorectal cancer screening, but it can detect hidden bleeding in the lower colon.  It does need to be repeated every year and if a positive result is obtained, you would be referred for a colonoscopy.  If you have completed either one of these tests at another facility, please have the records sent to our clinic for our records.  -schedule a PAP SMEAR EXAM which is due.  Please disregard this reminder if you have had this exam elsewhere within the last year.  It would be helpful for us to have a copy of your recent pap smear report to update your records.     Please call us at the [facility] - [phone] (or use [employer]) to address the above recommendations.     Thank you for trusting [facility] and we appreciate the opportunity  to serve you.  We look forward to supporting your healthcare needs in the future.    Healthy Regards,    Your Health Care Team  [facility]

## 2019-08-20 NOTE — TELEPHONE ENCOUNTER
Summary:    Patient is due/failing the following:   COLONOSCOPY, LDL, PAP and PHYSICAL    Action needed:   Patient needs office visit for Physical and PAP., Patient needs fasting lab only appointment and schedule a colonoscopy or complete a FIT test    Type of outreach:    Sent letter.    Questions for provider review:    None                                                                                                                                    Jessica Casper     Chart routed to Care Team .          Panel Management Review      Patient has the following on her problem list:     Depression / Dysthymia review    Measure:  Needs PHQ-9 score of 4 or less during index window.  Administer PHQ-9 and if score is 5 or more, send encounter to provider for next steps.    PHQ-9 SCORE 12/27/2017 11/30/2018 4/2/2019   PHQ-9 Total Score - - -   PHQ-9 Total Score MyChart - 6 (Mild depression) 6 (Mild depression)   PHQ-9 Total Score 4 6 6       If PHQ-9 recheck is 5 or more, route to provider for next steps.    Patient is due for:  PHQ9      Composite cancer screening  Chart review shows that this patient is due/due soon for the following Pap Smear and Colonoscopy

## 2019-09-06 ENCOUNTER — THERAPY VISIT (OUTPATIENT)
Dept: PHYSICAL THERAPY | Facility: CLINIC | Age: 49
End: 2019-09-06
Payer: COMMERCIAL

## 2019-09-06 DIAGNOSIS — M25.511 SHOULDER PAIN, RIGHT: ICD-10-CM

## 2019-09-06 PROCEDURE — 97110 THERAPEUTIC EXERCISES: CPT | Mod: GP | Performed by: PHYSICAL THERAPIST

## 2019-09-06 PROCEDURE — 97140 MANUAL THERAPY 1/> REGIONS: CPT | Mod: GP | Performed by: PHYSICAL THERAPIST

## 2019-09-06 PROCEDURE — 97112 NEUROMUSCULAR REEDUCATION: CPT | Mod: GP | Performed by: PHYSICAL THERAPIST

## 2019-09-09 NOTE — PROGRESS NOTES
Subjective:  HPI                    Objective:  System    Physical Exam    General     ROS    Assessment/Plan:    PROGRESS  REPORT    Progress reporting period is from 6/14/19 to 9/6/19.     SUBJECTIVE  Pt returns after 2.5 months. Is feeling some tightness in her arm with certain motions, but feeling pretty pain free for the most part during her normal day. Did some overhead painting last weekend and did notice soreness with that.       Initial Pain level: 8/10   Changes in function: (yes, see goal flow sheet)    OBJECTIVE  Shoulder AROM WNL and pain free in all planes  Repeated scaption x 15; pain free, good quality scapular movement, winging on R noted with fatigue (after about 10 reps)  Negative HK  Tender to palpation R deltoid, supraspinatus    ASSESSMENT/PLAN  Diagnosis 1:  R shoulder pain    Pain -  hot/cold therapy, manual therapy, splint/taping/bracing/orthotics, self management, education and home program  Decreased ROM/flexibility - manual therapy, therapeutic exercise and home program  Decreased strength - therapeutic exercise, therapeutic activities and home program  STG/LTGs have been met or progress has been made towards goals:  Yes (See Goal flow sheet completed today.)  Assessment of Progress: The patient's condition is improving.  The patient's condition has potential to improve.  Self Management Plans:  Patient has been instructed in a home treatment program.  Patient  has been instructed in self management of symptoms.  I have re-evaluated this patient and find that the nature, scope, duration and intensity of the therapy is appropriate for the medical condition of the patient.  Eileen continues to require the following intervention to meet STG and LTG's: PT    Recommendations:  This patient would benefit from continued therapy.     Frequency:  1 X a month, once daily  Duration:  for 2 months    Please refer to the daily flowsheet for treatment today, total treatment time and time spent  performing 1:1 timed codes.

## 2019-10-03 ENCOUNTER — HEALTH MAINTENANCE LETTER (OUTPATIENT)
Age: 49
End: 2019-10-03

## 2019-10-18 ASSESSMENT — ENCOUNTER SYMPTOMS
DEPRESSION: 1
NERVOUS/ANXIOUS: 1

## 2019-10-31 ENCOUNTER — OFFICE VISIT (OUTPATIENT)
Dept: INTERNAL MEDICINE | Facility: CLINIC | Age: 49
End: 2019-10-31
Payer: COMMERCIAL

## 2019-10-31 ENCOUNTER — TELEPHONE (OUTPATIENT)
Dept: GASTROENTEROLOGY | Facility: CLINIC | Age: 49
End: 2019-10-31

## 2019-10-31 VITALS
WEIGHT: 165.8 LBS | OXYGEN SATURATION: 100 % | SYSTOLIC BLOOD PRESSURE: 129 MMHG | HEART RATE: 90 BPM | BODY MASS INDEX: 25.87 KG/M2 | DIASTOLIC BLOOD PRESSURE: 87 MMHG

## 2019-10-31 DIAGNOSIS — Z12.12 ENCOUNTER FOR COLORECTAL CANCER SCREENING: ICD-10-CM

## 2019-10-31 DIAGNOSIS — Z79.899 LONG TERM CURRENT USE OF DIURETIC: ICD-10-CM

## 2019-10-31 DIAGNOSIS — Z00.00 ENCOUNTER FOR MEDICAL EXAMINATION TO ESTABLISH CARE: Primary | ICD-10-CM

## 2019-10-31 DIAGNOSIS — Z12.11 ENCOUNTER FOR COLORECTAL CANCER SCREENING: ICD-10-CM

## 2019-10-31 DIAGNOSIS — Z00.00 ENCOUNTER FOR MEDICAL EXAMINATION TO ESTABLISH CARE: ICD-10-CM

## 2019-10-31 LAB
ANION GAP SERPL CALCULATED.3IONS-SCNC: 4 MMOL/L (ref 3–14)
BUN SERPL-MCNC: 12 MG/DL (ref 7–30)
CALCIUM SERPL-MCNC: 9.3 MG/DL (ref 8.5–10.1)
CHLORIDE SERPL-SCNC: 104 MMOL/L (ref 94–109)
CHOLEST SERPL-MCNC: 208 MG/DL
CO2 SERPL-SCNC: 29 MMOL/L (ref 20–32)
CREAT SERPL-MCNC: 0.66 MG/DL (ref 0.52–1.04)
GFR SERPL CREATININE-BSD FRML MDRD: >90 ML/MIN/{1.73_M2}
GLUCOSE SERPL-MCNC: 93 MG/DL (ref 70–99)
HCV AB SERPL QL IA: NONREACTIVE
HDLC SERPL-MCNC: 82 MG/DL
LDLC SERPL CALC-MCNC: 115 MG/DL
NONHDLC SERPL-MCNC: 125 MG/DL
POTASSIUM SERPL-SCNC: 4.1 MMOL/L (ref 3.4–5.3)
SODIUM SERPL-SCNC: 137 MMOL/L (ref 133–144)
TRIGL SERPL-MCNC: 51 MG/DL
TSH SERPL DL<=0.005 MIU/L-ACNC: 1.36 MU/L (ref 0.4–4)

## 2019-10-31 ASSESSMENT — PATIENT HEALTH QUESTIONNAIRE - PHQ9: SUM OF ALL RESPONSES TO PHQ QUESTIONS 1-9: 12

## 2019-10-31 NOTE — PATIENT INSTRUCTIONS
[facility] Medication Refill Request Information:  * Please contact your pharmacy regarding ANY request for medication refills.  ** [facility] Prescription Fax = [phone]  * Please allow 3 business days for routine medication refills.  * Please allow 5 business days for controlled substance medication refills.     [facility] Test Result notification information:  *You will be notified with in 7-10 days of your appointment day regarding the results of your test.  If you are on MyChart you will be notified as soon as the provider has reviewed the results and signed off on them.    [facility]: [phone]          [facility]         Internal Medicine Resident                   Continuity Clinic    Who We Are    Resident Continuity Clinic is a part of the [facility] Primary Care Clinic.  Resident physicians see patients independently and establish a relationship with them over the course of their three-year residency program.  As with the Primary Care Clinic, our Resident Continuity Clinic models a group practice.  If your doctor is not available, you will be able to see another resident physician.  At the end of a resident s training, patients will be transitioned to a new resident physician for ongoing care.     We treat patients with a wide array of medical needs from routine physicals, to acute illnesses, to diabetes and blood pressure management, to complex medical illness.  What is a Resident Physician?    Resident physicians hold medical degrees and are doctors. They are training to become specialists in Internal Medicine. They work under the supervision of board-certified faculty physicians.  Expectations for Your Care    We strive to provide accessible, quality care at all times.    In order to provide this care, it is best to see your primary care resident doctor consistently rather switching between providers.  In the event you do see another physician, you should schedule  a follow-up visit with your usual primary care doctor.    If you are transitioning your care from another clinic, it is helpful to have your records available for your doctor to review.    We do not prescribe controlled substances, such as ADD medications or narcotic pain medications, on your first visit.  We will review your health records and concerns prior to devising a treatment plan with you in order to provide the best care.      Clinic Services     Extended clinic hours; patient  to help navigate your visit;  parking; laboratory and imaging services with evening and weekend hours    Multiple medical and surgical specialties in one building    Complementary services, including Nutrition, Integrative Medicine, Pharmacy consultations, Mental and Behavioral Health, Sports Medicine and Physical Therapy    Thank You    We would like to thank you for choosing the [facility] Internal Medicine Resident Continuity Clinic for your primary care. You are making a priceless contribution to the training of the next generation of health care practitioners.     Contact us at [phone] for appointments or questions.    Resident Clinic Hours are Tuesdays and Thursdays, 7:30am-5:00pm    Residents   Bo Garcia MD  (Male)   Eleazar Monreal MD   (Male)   Hazel Davis MD  (Female)  Rosa Maria Vela MD   (Female)   Christianne Chairez MD   (Female)    Shayy Kwok MD    (Female)   Clinton Stahl MD  (Male)   Lorenzo Low MD  (Male)    Shelby Huang MD  (Female)   Sim Salcido MD  (Female)   Heather Mathis MD    (Female)   Taiwo Montgomery MD  (Male)   Jesus Gutierres MD  (Male)   Jose Vasquez MD  (Male)   PAULA Ling MD   (Male)   Josh Pope MD  (Male)    Ros Arredondo MD (Female)   Nicho Rawls MD  (Male)   Luke Nicholas MD  (Male)   Debbie Prather MD  (Male)   Amisha Mcrae MD    (Female)   Lisa Meier MD  (Female)     Supervising Physicians   MD Ezequiel Moreno,  MD Iona Esposito, MD Jose Raul Avendano, MD Emir Null, MD Yoli Morales, MD Gary Young, MD Maeve Edouard MD          It was nice to meet you today. Head down to the lab for a blood draw. Schedule your colonoscopy for screening. Let's plan to meet again in 6 months.

## 2019-10-31 NOTE — NURSING NOTE
Chief Complaint   Patient presents with     Establish Care     Pt is here to establish care      AMNA Delatorre at 8:28 AM sign on 10/31/2019

## 2019-11-08 ENCOUNTER — THERAPY VISIT (OUTPATIENT)
Dept: PHYSICAL THERAPY | Facility: CLINIC | Age: 49
End: 2019-11-08
Payer: COMMERCIAL

## 2019-11-08 DIAGNOSIS — M25.511 SHOULDER PAIN, RIGHT: ICD-10-CM

## 2019-11-08 PROCEDURE — 97110 THERAPEUTIC EXERCISES: CPT | Mod: GP | Performed by: PHYSICAL THERAPIST

## 2019-11-08 PROCEDURE — 97140 MANUAL THERAPY 1/> REGIONS: CPT | Mod: GP | Performed by: PHYSICAL THERAPIST

## 2019-11-08 PROCEDURE — 97112 NEUROMUSCULAR REEDUCATION: CPT | Mod: GP | Performed by: PHYSICAL THERAPIST

## 2019-11-08 NOTE — PROGRESS NOTES
Subjective:  HPI                    Objective:  System    Physical Exam    General     ROS    Assessment/Plan:    PROGRESS  REPORT    Progress reporting period is from 9/6/19 to 11/8/19.     SUBJECTIVE  Pt is feeling better overall. Her main complaint is that she is feeling a pinching feeling at the top of her shoulder when reaching over her head. Also feeling like she can't raise it as high. Exercises are going well at home.       Initial Pain level: 8/10   Current Pain level: 1/10       OBJECTIVE  R shoulder AROM before joint mobs:  165 degrees, all others WNL  MMT shoulder ER; 4+/5 on R, pain free  Hypomobile GH joint posterior and anterior, inferior/superior WNL  Pt with improved shoulder flexion after joint mobs today (175 degrees)      ASSESSMENT/PLAN  Diagnosis 1:  R shoulder pain    Pain -  hot/cold therapy, manual therapy, splint/taping/bracing/orthotics, self management, education and home program  Decreased ROM/flexibility - manual therapy, therapeutic exercise and home program  Decreased strength - therapeutic exercise, therapeutic activities and home program  STG/LTGs have been met or progress has been made towards goals:  Yes (See Goal flow sheet completed today.)  Assessment of Progress: The patient's condition is improving.  Self Management Plans:  Patient has been instructed in a home treatment program.  Patient  has been instructed in self management of symptoms.  I have re-evaluated this patient and find that the nature, scope, duration and intensity of the therapy is appropriate for the medical condition of the patient.  Eileen continues to require the following intervention to meet STG and LTG's: PT      Recommendations:  This patient would benefit from continued therapy.     Frequency:  2 X a month, once daily  Duration:  for 2 months    Please refer to the daily flowsheet for treatment today, total treatment time and time spent performing 1:1 timed codes.

## 2019-11-19 ENCOUNTER — TELEPHONE (OUTPATIENT)
Dept: GASTROENTEROLOGY | Facility: CLINIC | Age: 49
End: 2019-11-19

## 2019-11-19 DIAGNOSIS — Z12.11 SPECIAL SCREENING FOR MALIGNANT NEOPLASMS, COLON: Primary | ICD-10-CM

## 2019-11-19 RX ORDER — BISACODYL 5 MG/1
TABLET, DELAYED RELEASE ORAL
Qty: 4 TABLET | Refills: 0 | Status: SHIPPED | OUTPATIENT
Start: 2019-11-23 | End: 2019-11-25

## 2019-11-19 NOTE — TELEPHONE ENCOUNTER
Patient Name: Eileen Valladares   : 1970  MRN: [id number]       : [x] N/A      VM with information needed to complete pre-assessment call.  Request pt contact Endoscopy Pre-assessment RN to complete upcoming procedure information. Reminder to refer to Cyndie msg of 2019 by JEANETTE Saunders. Telephone call-back number provided.    Pre-screening questions provided.     Saadia Canseco, RN, RN  [facility]/[employer] Endoscopy    Additional Information regarding appointment:      Patient scheduled for:   [x] Colonoscopy      Indication for procedure. [x] Screening       Sedation Type: [x] Conscious Sedation       Procedure Provider:  Killian      Referring Provider. Mold    Arrival time verified: .1245    Facility location verified:   [x][address], 5th floor       Prep Type:   [x]Golytely eRx: (not sent)    Anticoagulants or blood thinners: [x]None                Electronic implanted devices: [x] No      H&P / Pre op physical completed: [x] N/A    Additional Information: None at this time.   _______________________________________________

## 2019-11-20 NOTE — TELEPHONE ENCOUNTER
RE:Colonoscopy 11/25/19 @ 1245 pm   Received: Today   Message Contents   Eileen Anderson sent to Zuly Saunders RN   Phone Number: [phone]             REPLY FROM EILEEN ANDERSON:     Please contact the Endoscopy Pre-Assessment RN at: [phone] to confirm our records:   1) You don't take any anti-coagulation (blood-thinning) medications - CORRECT   2) You don't have any implanted electronic devices, such as a cardiac pacemaker, cardiac defibrillator, nerve stimulator, insulin pump, etc. - CORRECT   3) Provide the name of your  - AUGUSTIN AMAYA   4) Confirm that you have made arrangements for a responsible adult to stay with you for at least six (6) hours after your procedure.   YES WITH AUGUSTIN JOSE LUIS   5) Your pharmacy name/address where your bowel prep prescription may be sent. You will need to  your prescriptions for Dulcolax tablets and Golytely by wednesday afternoon.     PLEASE SEND TO [facility]: [id number] [place] [address]      Communication received by Zuly Saunders RN   Per request of patient via [employer]nethart prescription was sent to [facility]. Patient notified of this via my chart and e mail.

## 2019-11-21 ENCOUNTER — THERAPY VISIT (OUTPATIENT)
Dept: PHYSICAL THERAPY | Facility: CLINIC | Age: 49
End: 2019-11-21
Payer: COMMERCIAL

## 2019-11-21 DIAGNOSIS — M25.511 SHOULDER PAIN, RIGHT: ICD-10-CM

## 2019-11-21 PROCEDURE — 97110 THERAPEUTIC EXERCISES: CPT | Mod: GP | Performed by: PHYSICAL THERAPIST

## 2019-11-21 PROCEDURE — 97112 NEUROMUSCULAR REEDUCATION: CPT | Mod: GP | Performed by: PHYSICAL THERAPIST

## 2019-11-21 PROCEDURE — 97140 MANUAL THERAPY 1/> REGIONS: CPT | Mod: GP | Performed by: PHYSICAL THERAPIST

## 2019-11-22 ENCOUNTER — ANESTHESIA EVENT (OUTPATIENT)
Dept: SURGERY | Facility: AMBULATORY SURGERY CENTER | Age: 49
End: 2019-11-22

## 2019-11-25 ENCOUNTER — ANESTHESIA (OUTPATIENT)
Dept: SURGERY | Facility: AMBULATORY SURGERY CENTER | Age: 49
End: 2019-11-25

## 2019-11-25 ENCOUNTER — HOSPITAL ENCOUNTER (OUTPATIENT)
Facility: AMBULATORY SURGERY CENTER | Age: 49
End: 2019-11-25
Attending: INTERNAL MEDICINE
Payer: COMMERCIAL

## 2019-11-25 VITALS
TEMPERATURE: 98 F | DIASTOLIC BLOOD PRESSURE: 69 MMHG | HEART RATE: 86 BPM | HEIGHT: 68 IN | BODY MASS INDEX: 25.01 KG/M2 | RESPIRATION RATE: 16 BRPM | WEIGHT: 165 LBS | SYSTOLIC BLOOD PRESSURE: 106 MMHG | OXYGEN SATURATION: 100 %

## 2019-11-25 VITALS — HEART RATE: 82 BPM

## 2019-11-25 LAB
COLONOSCOPY: NORMAL
HCG UR QL: NEGATIVE
INTERNAL QC OK POCT: YES

## 2019-11-25 RX ORDER — ONDANSETRON 2 MG/ML
4 INJECTION INTRAMUSCULAR; INTRAVENOUS EVERY 30 MIN PRN
Status: DISCONTINUED | OUTPATIENT
Start: 2019-11-25 | End: 2019-11-26 | Stop reason: HOSPADM

## 2019-11-25 RX ORDER — PROPOFOL 10 MG/ML
INJECTION, EMULSION INTRAVENOUS PRN
Status: DISCONTINUED | OUTPATIENT
Start: 2019-11-25 | End: 2019-11-25

## 2019-11-25 RX ORDER — OXYCODONE HYDROCHLORIDE 5 MG/1
5 TABLET ORAL EVERY 4 HOURS PRN
Status: DISCONTINUED | OUTPATIENT
Start: 2019-11-25 | End: 2019-11-26 | Stop reason: HOSPADM

## 2019-11-25 RX ORDER — LIDOCAINE HYDROCHLORIDE 20 MG/ML
INJECTION, SOLUTION INFILTRATION; PERINEURAL PRN
Status: DISCONTINUED | OUTPATIENT
Start: 2019-11-25 | End: 2019-11-25

## 2019-11-25 RX ORDER — ACETAMINOPHEN 325 MG/1
975 TABLET ORAL ONCE
Status: DISCONTINUED | OUTPATIENT
Start: 2019-11-25 | End: 2019-11-25 | Stop reason: HOSPADM

## 2019-11-25 RX ORDER — ONDANSETRON 2 MG/ML
4 INJECTION INTRAMUSCULAR; INTRAVENOUS EVERY 6 HOURS PRN
Status: DISCONTINUED | OUTPATIENT
Start: 2019-11-25 | End: 2019-11-26 | Stop reason: HOSPADM

## 2019-11-25 RX ORDER — SODIUM CHLORIDE, SODIUM LACTATE, POTASSIUM CHLORIDE, CALCIUM CHLORIDE 600; 310; 30; 20 MG/100ML; MG/100ML; MG/100ML; MG/100ML
INJECTION, SOLUTION INTRAVENOUS CONTINUOUS
Status: DISCONTINUED | OUTPATIENT
Start: 2019-11-25 | End: 2019-11-26 | Stop reason: HOSPADM

## 2019-11-25 RX ORDER — PROPOFOL 10 MG/ML
INJECTION, EMULSION INTRAVENOUS CONTINUOUS PRN
Status: DISCONTINUED | OUTPATIENT
Start: 2019-11-25 | End: 2019-11-25

## 2019-11-25 RX ORDER — NALOXONE HYDROCHLORIDE 0.4 MG/ML
.1-.4 INJECTION, SOLUTION INTRAMUSCULAR; INTRAVENOUS; SUBCUTANEOUS
Status: DISCONTINUED | OUTPATIENT
Start: 2019-11-25 | End: 2019-11-26 | Stop reason: HOSPADM

## 2019-11-25 RX ORDER — FLUMAZENIL 0.1 MG/ML
0.2 INJECTION, SOLUTION INTRAVENOUS
Status: DISCONTINUED | OUTPATIENT
Start: 2019-11-25 | End: 2019-11-26 | Stop reason: HOSPADM

## 2019-11-25 RX ORDER — ONDANSETRON 4 MG/1
4 TABLET, ORALLY DISINTEGRATING ORAL EVERY 6 HOURS PRN
Status: DISCONTINUED | OUTPATIENT
Start: 2019-11-25 | End: 2019-11-26 | Stop reason: HOSPADM

## 2019-11-25 RX ORDER — LIDOCAINE 40 MG/G
CREAM TOPICAL
Status: DISCONTINUED | OUTPATIENT
Start: 2019-11-25 | End: 2019-11-25 | Stop reason: HOSPADM

## 2019-11-25 RX ORDER — FENTANYL CITRATE 50 UG/ML
25-50 INJECTION, SOLUTION INTRAMUSCULAR; INTRAVENOUS
Status: DISCONTINUED | OUTPATIENT
Start: 2019-11-25 | End: 2019-11-25 | Stop reason: HOSPADM

## 2019-11-25 RX ORDER — MEPERIDINE HYDROCHLORIDE 25 MG/ML
12.5 INJECTION INTRAMUSCULAR; INTRAVENOUS; SUBCUTANEOUS
Status: DISCONTINUED | OUTPATIENT
Start: 2019-11-25 | End: 2019-11-26 | Stop reason: HOSPADM

## 2019-11-25 RX ORDER — GABAPENTIN 300 MG/1
300 CAPSULE ORAL ONCE
Status: DISCONTINUED | OUTPATIENT
Start: 2019-11-25 | End: 2019-11-25 | Stop reason: HOSPADM

## 2019-11-25 RX ORDER — SODIUM CHLORIDE, SODIUM LACTATE, POTASSIUM CHLORIDE, CALCIUM CHLORIDE 600; 310; 30; 20 MG/100ML; MG/100ML; MG/100ML; MG/100ML
INJECTION, SOLUTION INTRAVENOUS CONTINUOUS PRN
Status: DISCONTINUED | OUTPATIENT
Start: 2019-11-25 | End: 2019-11-25

## 2019-11-25 RX ORDER — ONDANSETRON 2 MG/ML
4 INJECTION INTRAMUSCULAR; INTRAVENOUS
Status: DISCONTINUED | OUTPATIENT
Start: 2019-11-25 | End: 2019-11-25 | Stop reason: HOSPADM

## 2019-11-25 RX ORDER — ONDANSETRON 4 MG/1
4 TABLET, ORALLY DISINTEGRATING ORAL EVERY 30 MIN PRN
Status: DISCONTINUED | OUTPATIENT
Start: 2019-11-25 | End: 2019-11-26 | Stop reason: HOSPADM

## 2019-11-25 RX ADMIN — PROPOFOL: 10 INJECTION, EMULSION INTRAVENOUS at 13:51

## 2019-11-25 RX ADMIN — PROPOFOL 60 MG: 10 INJECTION, EMULSION INTRAVENOUS at 13:45

## 2019-11-25 RX ADMIN — PROPOFOL 40 MG: 10 INJECTION, EMULSION INTRAVENOUS at 13:37

## 2019-11-25 RX ADMIN — SODIUM CHLORIDE, SODIUM LACTATE, POTASSIUM CHLORIDE, CALCIUM CHLORIDE: 600; 310; 30; 20 INJECTION, SOLUTION INTRAVENOUS at 13:32

## 2019-11-25 RX ADMIN — LIDOCAINE HYDROCHLORIDE 60 MG: 20 INJECTION, SOLUTION INFILTRATION; PERINEURAL at 13:35

## 2019-11-25 RX ADMIN — PROPOFOL 150 MCG/KG/MIN: 10 INJECTION, EMULSION INTRAVENOUS at 13:35

## 2019-11-25 ASSESSMENT — MIFFLIN-ST. JEOR: SCORE: 1411.94

## 2019-11-25 NOTE — DISCHARGE INSTRUCTIONS
Discharge Instructions after Colonoscopy  or Sigmoidoscopy    Today you had a __x__ Colonoscopy ____ Sigmoidoscopy    Activity and Diet  You were given medicine for pain. You may be dizzy or sleepy.  For 24 hours:    Do not drive or use heavy equipment.    Do not make important decisions.    Do not drink any alcohol.  You may return to your normal diet and medicines.    Discomfort    Air was placed in your colon during the exam in order to see it. Walking helps to pass the air.    You may take Tylenol (acetaminophen) for pain unless your doctor has told you not to.  Do not take aspirin or ibuprofen (Advil, Motrin, or other anti-inflammatory  drugs) for _____ days.    Follow-up  ____ We took small tissue samples or polyps to study. Your doctor will call you with the results  within two weeks.    When to call:    Call right away if you have:    Unusual pain in belly or chest pain not relieved with passing air.    More than 1 to 2 Tablespoons of bleeding from your rectum.    Fever above 100.6  F (37.5  C).    If you have severe pain, bleeding, or shortness of breath, go to an emergency room.    If you have questions, call:  Monday to Friday, 7 a.m. to 4:30 p.m.  Endoscopy: [phone] (We may have to call you back)    After hours  Hospital: [phone] (Ask for the GI fellow on call)

## 2019-11-25 NOTE — PROGRESS NOTES
PRIMARY CARE CENTER         HPI:       Eileen Valladares is a 49 year old female with a history of Loeys-Becca syndrome type II, uterine fibroids, TMJ, MARIAN, MDD, who presents today to establish care. The patient notes that her Loeys-Becca diagnosis was found through her son four years ago. Her son developed syncope while running; further workup by Cardiology eventually revealed the diagnosis. The patient was identified as a carrier and also follows with Cardiology. The patient has also been found to have a hepatic hemangioma, and follows with Hepatology. Follows with Mental Health as well for her MDD and MARIAN. The patient notes she has gotten  in the past year. She had an intentional 45 lb weight loss via [employer]. 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 Sleeps well, notes her diet is lacking in fruits and vegetables. The patient is currently sexually active, has a Mirena IUD and declines STI screening currently. Follows with Ophthalmology as well. Some mild constipation presently, no hematochezia or blood on the tissue. Interested in a referral for a colonoscopy. The patient denies headache, vision changes, fever, chills, nausea, vomiting, dizziness, lightheadedness, dysphagia, odynophagia, chest pain, shortness of breath, palpitations, abdominal pain, diarrhea, hematochezia, melena, dysuria or hematuria.     Problem, Medication and Allergy Lists were   reviewed and are current.     Patient Active Problem List    Diagnosis Date Noted     Moderate episode of recurrent major depressive disorder (H) 11/30/2018     Priority: Medium     Shoulder pain, right 11/05/2018     Priority: Medium     Obesity, Class I, BMI 30-34.9 09/23/2015     Priority: Medium     Temporomandibular joint disorder      Priority: Medium     Problem list name updated by automated process. Provider to review       Hyperlipidemia with target LDL less than 130 09/22/2015     Priority: Medium     Diagnosis updated by  automated process. Provider to review and confirm.       Generalized anxiety disorder 01/04/2008     Priority: Medium     GRANULOMA LUNG 12/27/2005     Priority: Medium     CYST KIDNEY 12/27/2005     Priority: Medium         Current Outpatient Medications   Medication Sig Dispense Refill     buPROPion (WELLBUTRIN XL) 300 MG 24 hr tablet Take 1 tablet (300 mg) by mouth every morning 90 tablet 1     Cholecalciferol (VITAMIN D) 2000 UNITS tablet Take 2,000 Units by mouth daily 100 tablet 3     MULTI-VITAMIN OR TABS        benzoyl peroxide 5 % external liquid Use daily as directed as a face wash and rinse off completely to avoid bleaching towels (Patient not taking: Reported on 4/25/2019) 226 g 11     CALCIUM + D 600-200 MG-UNIT OR TABS 2 TABLET DAILY       spironolactone (ALDACTONE) 100 MG tablet Take 1 tablet (100 mg) by mouth daily In the morning 90 tablet 4     tretinoin (RETIN-A) 0.025 % external cream Use every night and apply a PEA-sized amount to the face and wear sunscreen in the morning 45 g 11         Allergies   Allergen Reactions     No Known Drug Allergies      Patient is a new patient to this clinic and so I reviewed/updated the Past Medical History, the Family History and the Social History.    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    TGFRR2 variant positive, no signs or symptoms of the syndrome     Temporomandibular joint disorders, unspecified      Uterine fibroid 2015     Family History     Problem (# of Occurrences) Relation (Name,Age of Onset)    Anxiety Disorder (1)  Sister (Stacie Anne)    CHARO (1) Father:  age 64 of  ischemic bowel    Cancer (1) Paternal Grandmother    Cardiovascular (1) Paternal Uncle: stroke in his 50s    Cerebrovascular Disease (1) Father: ischemic strokes, questionable alzheimers, first cva at 58, first MI in his 40s, 2 CABG procedures for MIs    Colon Polyps (1) Father: benign    Depression (1) Mother    Diabetes (3) Father:  at age 64, Maternal Grandmother (Violetta Dejesus):  at age 76, Maternal Grandfather (Arben Dejesus): diet at age 76    Hyperlipidemia (1) Father    Hypertension (4) Mother, Father: also high cholesterol, Maternal Grandmother (Violetta Dejesus), Maternal Grandfather (Arben Dejesus)    Other Cancer (1) Mother: Lung () & Kidney ()        Social History     Socioeconomic History     Marital status:      Spouse name: Clinton     Number of children: 2     Years of education: None     Highest education level: None   Occupational History     Occupation: Pharmacist     Employer: [employer]     Comment: [employer]   Social Needs     Financial resource strain: None     Food insecurity:     Worry: None     Inability: None     Transportation needs:     Medical: None     Non-medical: None   Tobacco Use     Smoking status: Never Smoker     Smokeless tobacco: Never Used   Substance and Sexual Activity     Alcohol use: Yes     Alcohol/week: 1.0 standard drinks     Types: 1 Standard drinks or equivalent per week     Comment: 1 drink per week     Drug use: No     Sexual activity: Yes     Partners: Male     Birth control/protection: Male Surgical   Lifestyle     Physical activity:     Days per week: None     Minutes per session: None     Stress: None   Relationships     Social connections:     Talks on phone: None     Gets together: None     Attends Quaker service: None     Active member of club or organization: None     Attends meetings of clubs or organizations: None     Relationship status: None     Intimate  partner violence:     Fear of current or ex partner: None     Emotionally abused: None     Physically abused: None     Forced sexual activity: None   Other Topics Concern     Parent/sibling w/ CABG, MI or angioplasty before 65F 55M? Yes     Comment: Father   Social History Narrative     Lives in [place] with her significant other. She is a pharmacist.            Review of Systems:     ROS  10-point review of systems negative unless otherwise specified in HPI.    Answers for HPI/ROS submitted by the patient on 10/18/2019   General Symptoms: No  Skin Symptoms: No  HENT Symptoms: No  EYE SYMPTOMS: No  HEART SYMPTOMS: No  LUNG SYMPTOMS: No  INTESTINAL SYMPTOMS: No  URINARY SYMPTOMS: No  GYNECOLOGIC SYMPTOMS: No  BREAST SYMPTOMS: No  SKELETAL SYMPTOMS: No  BLOOD SYMPTOMS: No  NERVOUS SYSTEM SYMPTOMS: No  MENTAL HEALTH SYMPTOMS: Yes  Nervous or Anxious: Yes  Depression: Yes  Mood changes: Yes          Physical Exam:   /87 (BP Location: Right arm, Patient Position: Sitting, Cuff Size: Adult Regular)   Pulse 90   Wt 75.2 kg (165 lb 12.8 oz)   SpO2 100%   Breastfeeding No   BMI 25.87 kg/m    Body mass index is 25.87 kg/m .  Vitals were reviewed  General: Pleasant woman in NAD  HEENT: AT/NC, PERRL, EOMI, anicteric sclerae, conjunctivae without injection, benign oropharynx, MMM  Neck: Supple, no palpable lymphadenopathy  Cardiovascular: RRR, normal S1 S2, no m/r/g, 2+ peripheral pulses, no peripheral edema, no JVP  Respiratory: LCTAB, no w/r/r, normal respiratory effort  Abdominal: Soft, NTND, normal bowel sounds, no hepatosplenomegaly  Musculoskeletal: Normal bulk, no appreciable joint abnormalities  Skin: No rashes, wounds, ulcers, petechiae, purpurae or ecchymoses, no jaundice  Neurologic: CN II-XII grossly intact, no focal deficits, AAOx4        Results:     Laboratory and imaging results were reviewed in Epic.    Assessment and Plan   49 year old female with a history of Loeys-Becca syndrome type II, uterine  fibroids, TMJ, MARIAN, MDD, who presents today to establish care and for age-appropriate cancer screening.    Encounter for medical examination to establish care: Patient with no acute complaints today. Will check lipid panel, TSH and screen for HCV. Patient declines STI screening presently.  -     Lipid panel reflex to direct LDL Fasting; Future  -     TSH with free T4 reflex; Future  -     HCV Screen; Future    Long term current use of diuretic: Patient has been on spironolactone for acne vulgaris. Will check kidney function and electrolytes.  -     Basic metabolic panel; Future    Encounter for colorectal cancer screening: Will refer patient to GI for age-appropriate colorectal cancer screening.   -     GASTROENTEROLOGY ADULT REF PROCEDURE ONLY    Options for treatment and follow-up care were reviewed with the patient. Eileen Valladares engaged in the decision making process and verbalized understanding of the options discussed and agreed with the final plan.    The patient should return to clinic for follow up with me in 6 months.    Josh Pope MD PhD  Oct 31, 2019    The patient was seen and discussed with Dr. Gary Meyer, who agrees with the assessment and plan.  Ms Valladares seen and examined with the resident Dr Pope,i have reviewed his note and plan and I agree.  Gary Meyer Md

## 2019-11-25 NOTE — ANESTHESIA POSTPROCEDURE EVALUATION
Anesthesia POST Procedure Evaluation    Patient: Eileen Valladares   MRN:     [id number] Gender:   female   Age:    49 year old :      1970        Preoperative Diagnosis: Encounter for colorectal cancer screening [Z12.11, Z12.12]   Procedure(s):  COLONOSCOPY, WITH POLYPECTOMY AND BIOPSY   Postop Comments: No value filed.       Anesthesia Type:  Not documented  MAC    Reportable Event: NO     PAIN: Uncomplicated   Sign Out status: Comfortable, Well controlled pain     PONV: No PONV   Sign Out status:  No Nausea or Vomiting     Neuro/Psych: Uneventful perioperative course   Sign Out Status: Preoperative baseline; Age appropriate mentation     Airway/Resp.: Uneventful perioperative course   Sign Out Status: Non labored breathing, age appropriate RR; Resp. Status within EXPECTED Parameters     CV: Uneventful perioperative course   Sign Out status: Appropriate BP and perfusion indices; Appropriate HR/Rhythm     Disposition:   Sign Out in:  Phase II  Disposition:  Home  Recovery Course: Uneventful  Follow-Up: Not required           Last Anesthesia Record Vitals:  CRNA VITALS  2019 1335 - 2019 1435      2019             Pulse:  79    SpO2:  100 %          Last PACU Vitals:  Vitals Value Taken Time   BP     Temp     Pulse 82 2019  2:00 PM   Resp     SpO2     Temp src Skin 2019  2:03 PM   NIBP 99/63 2019  2:00 PM   Pulse 79 2019  2:04 PM   SpO2 100 % 2019  2:04 PM   Resp     Temp     Ht Rate 79 2019  2:03 PM   Temp 2           Electronically Signed By: Efraín Rubio MD, 2019, 3:18 PM

## 2019-11-25 NOTE — ANESTHESIA CARE TRANSFER NOTE
Patient: Eileen Valladares    Procedure(s):  COLONOSCOPY, WITH POLYPECTOMY AND BIOPSY    Diagnosis: Encounter for colorectal cancer screening [Z12.11, Z12.12]  Diagnosis Additional Information: No value filed.    Anesthesia Type:   MAC     Note:  Airway :Room Air  Patient transferred to:Phase II  Comments: Awake and fully oriented.   Denies discomfort.   VSS.  Report to Joann GONZALESHandoff Report: Identifed the Patient, Identified the Reponsible Provider, Reviewed the pertinent medical history, Discussed the surgical course, Reviewed Intra-OP anesthesia mangement and issues during anesthesia, Set expectations for post-procedure period and Allowed opportunity for questions and acknowledgement of understanding      Vitals: (Last set prior to Anesthesia Care Transfer)    CRNA VITALS  11/25/2019 1335 - 11/25/2019 1412      11/25/2019             Pulse:  79    SpO2:  100 %                Electronically Signed By: SUSANNAH Evans CRNA  November 25, 2019  2:12 PM

## 2019-11-25 NOTE — ANESTHESIA PREPROCEDURE EVALUATION
Anesthesia Pre-Procedure Evaluation    Patient: Eileen Valladares   MRN:     [id number] Gender:   female   Age:    49 year old :      1970        Preoperative Diagnosis: Encounter for colorectal cancer screening [Z12.11, Z12.12]   Procedure(s):  COLONOSCOPY     Past Medical History:   Diagnosis Date     Anxiety, generalized     controlled with sertraline     Depressive disorder 2016    Going through divorce     Family history of other specified malignant neoplasm     Mom with primary lung CA and primary renal cell CA     Family history of other specified malignant neoplasm      Female infertility of unspecified origin     Clomid to conceive 2 sons     Fibrocystic Breast Disease      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 Pt has had prior anesthetic.            ROS/MED HX    ENT/Pulmonary:  - neg pulmonary ROS     Neurologic:  - neg neurologic ROS     Cardiovascular:  - neg cardiovascular ROS       METS/Exercise Tolerance:  >4 METS   Hematologic:  - neg hematologic  ROS       Musculoskeletal:  - neg musculoskeletal ROS       GI/Hepatic:  - neg GI/hepatic ROS       Renal/Genitourinary:         Endo:  - neg endo ROS       Psychiatric:  - neg psychiatric ROS       Infectious Disease:  - neg infectious disease ROS       Malignancy:         Other:                         PHYSICAL EXAM:   Mental Status/Neuro: A/A/O   Airway: Facies: Feasible  Mallampati: I  Mouth/Opening: Full  TM distance: > 6 cm  Neck ROM: Full   Respiratory: Auscultation: CTAB     Resp. Rate: Normal     Resp. Effort: Normal      CV:  "Rhythm: Regular  Rate: Age appropriate  Heart: Normal Sounds  Edema: None   Comments:      Dental: Normal Dentition                LABS:  CBC:   Lab Results   Component Value Date    WBC 10.1 12/03/2018    WBC 8.9 09/17/2015    HGB 13.2 12/03/2018    HGB 13.4 09/17/2015    HCT 38.3 12/03/2018    HCT 40.2 09/17/2015     12/03/2018     09/17/2015     BMP:   Lab Results   Component Value Date     10/31/2019     04/25/2019    POTASSIUM 4.1 10/31/2019    POTASSIUM 4.0 04/25/2019    CHLORIDE 104 10/31/2019    CHLORIDE 105 04/25/2019    CO2 29 10/31/2019    CO2 27 04/25/2019    BUN 12 10/31/2019    BUN 13 04/25/2019    CR 0.66 10/31/2019    CR 0.67 04/25/2019    GLC 93 10/31/2019    GLC 87 04/25/2019     COAGS:   Lab Results   Component Value Date    PTT 22 05/23/2006    INR 1.00 12/03/2018     POC:   Lab Results   Component Value Date    HCG Negative 11/25/2019     OTHER:   Lab Results   Component Value Date    A1C 5.1 11/30/2009    DANIELLA 9.3 10/31/2019    ALBUMIN 3.7 12/03/2018    PROTTOTAL 7.0 12/03/2018    ALT 22 12/03/2018    AST 11 12/03/2018    ALKPHOS 71 12/03/2018    BILITOTAL 0.4 12/03/2018    TSH 1.36 10/31/2019    T4 0.81 11/30/2009    CRP 1.6 11/30/2009    SED 9 10/24/2005        Preop Vitals    BP Readings from Last 3 Encounters:   11/25/19 120/77   10/31/19 129/87   04/03/19 116/72    Pulse Readings from Last 3 Encounters:   11/25/19 83   10/31/19 90   04/03/19 90      Resp Readings from Last 3 Encounters:   11/25/19 16   04/03/19 16   11/30/18 14    SpO2 Readings from Last 3 Encounters:   11/25/19 100%   10/31/19 100%   04/03/19 100%      Temp Readings from Last 1 Encounters:   11/25/19 36.8  C (98.2  F) (Temporal)    Ht Readings from Last 1 Encounters:   11/25/19 1.727 m (5' 8\")      Wt Readings from Last 1 Encounters:   11/25/19 74.8 kg (165 lb)    Estimated body mass index is 25.09 kg/m  as calculated from the following:    Height as of this encounter: 1.727 m (5' 8\").    Weight " as of this encounter: 74.8 kg (165 lb).     LDA:        Assessment:   ASA SCORE: 1      Smoking Status:  Non-Smoker/Unknown   NPO Status: NPO Appropriate     Plan:   Anes. Type:  MAC   Pre-Medication: None   Induction:  IV (Standard)   Airway: Native Airway   Access/Monitoring: PIV   Maintenance: Propofol Sedation     Postop Plan:   Postop Pain: None  Postop Sedation/Airway: Not planned  Disposition: Outpatient     PONV Management:   Adult Risk Factors: Female, Non-Smoker   Prevention: Ondansetron, Propofol     CONSENT: Direct conversation   Plan and risks discussed with: Patient   Blood Products: Consent Deferred (Minimal Blood Loss)                   Efraín Rubio MD

## 2019-11-26 DIAGNOSIS — K76.9 LIVER LESION: Primary | ICD-10-CM

## 2019-11-27 LAB — COPATH REPORT: NORMAL

## 2019-11-30 NOTE — H&P
Eileen Valladares  [id number]  female  49 year old      Reason for procedure/surgery: screening for colorectal cancer     Patient Active Problem List   Diagnosis     GRANULOMA LUNG     CYST KIDNEY     Generalized anxiety disorder     Hyperlipidemia with target LDL less than 130     Temporomandibular joint disorder     Obesity, Class I, BMI 30-34.9     Shoulder pain, right     Moderate episode of recurrent major depressive disorder (H)       Past Surgical History:    Past Surgical History:   Procedure Laterality Date     BIOPSY Bilateral Breast 2001    benign     COLONOSCOPY  12/02     negative     COLONOSCOPY N/A 11/25/2019    Procedure: COLONOSCOPY, WITH POLYPECTOMY AND BIOPSY;  Surgeon: Sonia Daily MD;  Location: [facility] OR     DILATION AND CURETTAGE  1997    miscarriage     TONSILLECTOMY & ADENOIDECTOMY      as a child       Past Medical History:   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    TGFRR2 variant positive, no signs or symptoms of the syndrome     Temporomandibular joint disorders, unspecified      Uterine fibroid 2015       Social History:   Social History     Tobacco Use     Smoking status: Never Smoker     Smokeless tobacco: Never Used   Substance Use Topics     Alcohol use: Yes     Alcohol/week: 1.0 standard drinks     Types: 1 Standard drinks or equivalent per week     Comment: 1 drink per week       Family History:   Family History   Problem Relation Age of Onset     Hypertension Mother      Other Cancer Mother         Lung (2001) & Kidney (2005)     Depression Mother       [email or web address].KATHLEEN. Father          age 64 of  ischemic bowel     Colon Polyps Father         benign     Diabetes Father          at age 64     Hypertension Father         also high cholesterol     Hyperlipidemia Father      Cerebrovascular Disease Father         ischemic strokes, questionable alzheimers, first cva at 58, first MI in his 40s, 2 CABG procedures for MIs     Cancer Paternal Grandmother      Cardiovascular Paternal Uncle         stroke in his 50s     Diabetes Maternal Grandmother          at age 76     Hypertension Maternal Grandmother      Diabetes Maternal Grandfather         diet at age 76     Hypertension Maternal Grandfather      Anxiety Disorder Sister        Allergies:   Allergies   Allergen Reactions     No Known Drug Allergies        Active Medications:   Current Outpatient Medications   Medication Sig Dispense Refill     buPROPion (WELLBUTRIN XL) 300 MG 24 hr tablet Take 1 tablet (300 mg) by mouth every morning 90 tablet 1     CALCIUM + D 600-200 MG-UNIT OR TABS 2 TABLET DAILY       Cholecalciferol (VITAMIN D) 2000 UNITS tablet Take 2,000 Units by mouth daily 100 tablet 3     MULTI-VITAMIN OR TABS        spironolactone (ALDACTONE) 100 MG tablet Take 1 tablet (100 mg) by mouth daily In the morning 90 tablet 4     tretinoin (RETIN-A) 0.025 % external cream Use every night and apply a PEA-sized amount to the face and wear sunscreen in the morning 45 g 11     benzoyl peroxide 5 % external liquid Use daily as directed as a face wash and rinse off completely to avoid bleaching towels (Patient not taking: Reported on 2019) 226 g 11       Systemic Review:   CONSTITUTIONAL: NEGATIVE for fever, chills, change in weight  ENT/MOUTH: NEGATIVE for ear, mouth and throat problems  RESP: NEGATIVE for significant cough or SOB  CV: NEGATIVE for chest pain, palpitations or peripheral edema    Physical Examination:   Vital Signs: /69 (Cuff Size: Adult Regular)   Pulse 86   Temp 98  F (36.7  C)  "(Temporal)   Resp 16   Ht 1.727 m (5' 8\")   Wt 74.8 kg (165 lb)   SpO2 100%   BMI 25.09 kg/m    GENERAL: healthy, alert and no distress  NECK: no adenopathy, no asymmetry, masses, or scars  RESP: lungs clear to auscultation - no rales, rhonchi or wheezes  CV: regular rate and rhythm, normal S1 S2, no S3 or S4, no murmur, click or rub, no peripheral edema and peripheral pulses strong  ABDOMEN: soft, nontender, no hepatosplenomegaly, no masses and bowel sounds normal  MS: no gross musculoskeletal defects noted, no edema    Plan: Appropriate to proceed as scheduled.      Sonia Daily MD  11/30/2019    PCP:  Mic Martinez    "

## 2019-12-02 ENCOUNTER — OFFICE VISIT (OUTPATIENT)
Dept: GASTROENTEROLOGY | Facility: CLINIC | Age: 49
End: 2019-12-02
Attending: INTERNAL MEDICINE
Payer: COMMERCIAL

## 2019-12-02 VITALS
HEIGHT: 68 IN | HEART RATE: 99 BPM | TEMPERATURE: 98.5 F | WEIGHT: 166.1 LBS | RESPIRATION RATE: 18 BRPM | OXYGEN SATURATION: 99 % | DIASTOLIC BLOOD PRESSURE: 82 MMHG | SYSTOLIC BLOOD PRESSURE: 145 MMHG | BODY MASS INDEX: 25.17 KG/M2

## 2019-12-02 DIAGNOSIS — K76.9 LIVER LESION: Primary | ICD-10-CM

## 2019-12-02 DIAGNOSIS — K76.9 LIVER LESION: ICD-10-CM

## 2019-12-02 LAB
ALBUMIN SERPL-MCNC: 4.1 G/DL (ref 3.4–5)
ALP SERPL-CCNC: 88 U/L (ref 40–150)
ALT SERPL W P-5'-P-CCNC: 18 U/L (ref 0–50)
ANION GAP SERPL CALCULATED.3IONS-SCNC: 4 MMOL/L (ref 3–14)
AST SERPL W P-5'-P-CCNC: 13 U/L (ref 0–45)
BILIRUB DIRECT SERPL-MCNC: 0.1 MG/DL (ref 0–0.2)
BILIRUB SERPL-MCNC: 0.5 MG/DL (ref 0.2–1.3)
BUN SERPL-MCNC: 9 MG/DL (ref 7–30)
CALCIUM SERPL-MCNC: 9.5 MG/DL (ref 8.5–10.1)
CHLORIDE SERPL-SCNC: 104 MMOL/L (ref 94–109)
CO2 SERPL-SCNC: 28 MMOL/L (ref 20–32)
CREAT SERPL-MCNC: 0.61 MG/DL (ref 0.52–1.04)
ERYTHROCYTE [DISTWIDTH] IN BLOOD BY AUTOMATED COUNT: 11.9 % (ref 10–15)
GFR SERPL CREATININE-BSD FRML MDRD: >90 ML/MIN/{1.73_M2}
GLUCOSE SERPL-MCNC: 76 MG/DL (ref 70–99)
HCT VFR BLD AUTO: 40.7 % (ref 35–47)
HGB BLD-MCNC: 13.9 G/DL (ref 11.7–15.7)
INR PPP: 1.02 (ref 0.86–1.14)
MCH RBC QN AUTO: 32.8 PG (ref 26.5–33)
MCHC RBC AUTO-ENTMCNC: 34.2 G/DL (ref 31.5–36.5)
MCV RBC AUTO: 96 FL (ref 78–100)
PLATELET # BLD AUTO: 296 10E9/L (ref 150–450)
POTASSIUM SERPL-SCNC: 4.1 MMOL/L (ref 3.4–5.3)
PROT SERPL-MCNC: 7.6 G/DL (ref 6.8–8.8)
RBC # BLD AUTO: 4.24 10E12/L (ref 3.8–5.2)
SODIUM SERPL-SCNC: 136 MMOL/L (ref 133–144)
WBC # BLD AUTO: 9.7 10E9/L (ref 4–11)

## 2019-12-02 PROCEDURE — 36415 COLL VENOUS BLD VENIPUNCTURE: CPT | Performed by: INTERNAL MEDICINE

## 2019-12-02 PROCEDURE — 80048 BASIC METABOLIC PNL TOTAL CA: CPT | Performed by: INTERNAL MEDICINE

## 2019-12-02 PROCEDURE — 85027 COMPLETE CBC AUTOMATED: CPT | Performed by: INTERNAL MEDICINE

## 2019-12-02 PROCEDURE — 85610 PROTHROMBIN TIME: CPT | Performed by: INTERNAL MEDICINE

## 2019-12-02 PROCEDURE — G0463 HOSPITAL OUTPT CLINIC VISIT: HCPCS | Mod: ZF

## 2019-12-02 PROCEDURE — 80076 HEPATIC FUNCTION PANEL: CPT | Performed by: INTERNAL MEDICINE

## 2019-12-02 ASSESSMENT — MIFFLIN-ST. JEOR: SCORE: 1416.92

## 2019-12-02 ASSESSMENT — PAIN SCALES - GENERAL: PAINLEVEL: NO PAIN (0)

## 2019-12-02 NOTE — NURSING NOTE
"Chief Complaint   Patient presents with     RECHECK     Liver lesion       Vital signs:  Temp: 98.5  F (36.9  C) Temp src: Oral BP: (!) 145/82 Pulse: 99   Resp: 18 SpO2: 99 %     Height: 172.7 cm (5' 8\") Weight: 75.3 kg (166 lb 1.6 oz)  Estimated body mass index is 25.26 kg/m  as calculated from the following:    Height as of this encounter: 1.727 m (5' 8\").    Weight as of this encounter: 75.3 kg (166 lb 1.6 oz).        Nely Contreras, [facility]  12/2/2019 3:36 PM      "

## 2019-12-02 NOTE — LETTER
12/2/2019        RE: Eileen Valladares  [address]  [facility] [address]     Dear Colleague,    Thank you for referring your patient, Eileen Valladares, to the [facility] HEPATOLOGY at [facility]. Please see a copy of my visit note below.         A/P  Eileen Valladares is a 49 year old female with an incidental liver lesion, aroung 1.5 cm. Less conspicuous on follow up 2 years later. May be fatty sparking or adenoma. Now off OCPS for last 8 years. No liver disease, UTD on cancer screening, normal liver tests.     No concerning features of the spot or her history. If it is an adenoma, would simply follow for a while.     Will get MRI and review at tumor conference. MRI ordered.     Saskia Plascencia MD    Hepatology/Liver Transplant  Medical Director, Liver Transplantation  [facility]    Appointments [phone]  Clinic Fax [phone]  Transplant Care [phone] Option 4  Transplant Fax [phone]  Administrative Office [phone]  Administrative Fax [phone]  ===================================================================    Subjective  Eileen Valladares is a 49 year old female referred for liver lesion.     Spot in liver dates back to 2016 and is thought to be adenoma or focal fatty infiltration.     She was on OCPs from 15 to 26, then first child at age 28, then 2nd child at 31. Then she was on OCPS for a few years, last time was 8 years ago. Now has a Mirena. No hormone use since OCPs.    She is going on vacation next week. She doesn't know if she wants have the MRI until after.     She will get vascular study follow up in 2021 for follow up of Loeys Becca syndrome.     Imaging done for  MRI 11/16/18  1. Decreased conspicuity of an ill-defined nonenhancing, fat-containing lesion along the capsule in hepatic segment 2, which is most consistent with focal fat deposition, though a small adenoma remains a possibility.  2.  Unchanged scattered T2 hyperintense enhancing splenic foci, most consistent with hemangiomas.  3. Decreased size of the previously described enlarged left periaortic  lymph node, which is now nonenlarged and was likely reactive.  4. 8 mm hemorrhagic/proteinaceous cyst in the lower pole of the right kidney. Scattered simple cysts elsewhere in the kidneys. Mild right pelvocaliectasis similar to prior exams.   5. No arterial aneurysm identified.     No history of liver disease.     Past Medical History        Past Medical History:   Diagnosis Date     Anxiety, generalized 2001     controlled with sertraline     Depressive disorder 11/1/2016     Going through divorce     Family history of other specified malignant neoplasm       Mom with primary lung CA and primary renal cell CA     Family history of other specified malignant neoplasm       Female infertility of unspecified origin       Clomid to conceive 2 sons     Fibrocystic Breast Disease       Loeys-Becca syndrome type 2 07/2016     TGFRR2 variant positive, no signs or symptoms of the syndrome     Temporomandibular joint disorders, unspecified       Uterine fibroid 2015            Social History   Social History            Social History     Marital status:        Spouse name: Clinton     Number of children: 2     Years of education: N/A            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 Yes       Father      Social History Narrative            Family History          Family History   Problem Relation Age of Onset  "    Hypertension Mother       Other Cancer Mother         Lung () & Kidney ()     Depression Mother       C.A.D. Father          age 64 of  ischemic bowel     Colon Polyps Father         benign     Diabetes Father          at age 64     Hypertension Father         also high cholesterol     Hyperlipidemia Father       Cerebrovascular Disease Father         ischemic strokes, questionable alzheimers, first cva at 58, first MI in his 40s, 2 CABG procedures for MIs     Cancer Paternal Grandmother       Cardiovascular Paternal Uncle         stroke in his 50s     Diabetes Maternal Grandmother          at age 76     Hypertension Maternal Grandmother       Diabetes Maternal Grandfather         diet at age 76     Hypertension Maternal Grandfather       Anxiety Disorder Sister              ROS: 10 point ROS neg other than the symptoms noted above in the HPI.     BP (!) 145/82 (BP Location: Right arm, Patient Position: Sitting, Cuff Size: Adult Regular)   Pulse 99   Temp 98.5  F (36.9  C) (Oral)   Resp 18   Ht 1.727 m (5' 8\")   Wt 75.3 kg (166 lb 1.6 oz)   SpO2 99%   BMI 25.26 kg/m       Constitutional: alert and no distress.   Neck: Neck supple. No adenopathy. Thyroid symmetric, normal size  HEENT:Normocephalic. No masses, lesions, tenderness or abnormalities. No temporal muscle wasting ENT exam normal, no neck nodes or sinus tenderness. No oral lesions  Cardiovascular: negative, No lifts, heaves, or thrills. RRR. No murmurs, clicks gallops or rub  Respiratory: negative, Good diaphragmatic excursion. Lungs clear. No wheezes or rales  Gastrointestinal: Abdomen soft, non-tender. BS normal.  No masses, organomegaly  Skin: no suspicious lesions or rashes. No spider angiomata or palmar erythema. Nails normal.  Neurologic: Alert and oriented x3. No asterixis or tremor. Gait normal.   Psychiatric:  Appropriate, well groomed.  Hematologic/Lymphatic/Immunologic: Normal cervical and supraclavicular  lymph " nodes         Again, thank you for allowing me to participate in the care of your patient.      Sincerely,    Saskia Plascencia MD

## 2019-12-02 NOTE — LETTER
12/2/2019      RE: Eileen Valladares  [address]  [facility] [address]            A/P  Eileen Valladares is a 49 year old female with an incidental liver lesion, aroung 1.5 cm. Less conspicuous on follow up 2 years later. May be fatty sparking or adenoma. Now off OCPS for last 8 years. No liver disease, UTD on cancer screening, normal liver tests.     No concerning features of the spot or her history. If it is an adenoma, would simply follow for a while.     Will get MRI and review at tumor conference. MRI ordered.     Saskia Plascencia MD    Hepatology/Liver Transplant  Medical Director, Liver Transplantation  [facility]    Appointments [phone]  Clinic Fax [phone]  Transplant Care [phone] Option 4  Transplant Fax [phone]  Administrative Office [phone]  Administrative Fax [phone]  ===================================================================    Subjective  Eileen Valladares is a 49 year old female referred for liver lesion.     Spot in liver dates back to 2016 and is thought to be adenoma or focal fatty infiltration.     She was on OCPs from 15 to 26, then first child at age 28, then 2nd child at 31. Then she was on OCPS for a few years, last time was 8 years ago. Now has a Mirena. No hormone use since OCPs.    She is going on vacation next week. She doesn't know if she wants have the MRI until after.     She will get vascular study follow up in 2021 for follow up of Loeys Becca syndrome.     Imaging done for  MRI 11/16/18  1. Decreased conspicuity of an ill-defined nonenhancing, fat-containing lesion along the capsule in hepatic segment 2, which is most consistent with focal fat deposition, though a small adenoma remains a possibility.  2. Unchanged scattered T2 hyperintense enhancing splenic foci, most consistent with hemangiomas.  3. Decreased size of the previously described enlarged left periaortic  lymph node, which is now nonenlarged and  was likely reactive.  4. 8 mm hemorrhagic/proteinaceous cyst in the lower pole of the right kidney. Scattered simple cysts elsewhere in the kidneys. Mild right pelvocaliectasis similar to prior exams.   5. No arterial aneurysm identified.     No history of liver disease.     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 or symptoms of the syndrome     Temporomandibular joint disorders, unspecified       Uterine fibroid             Social History   Social History            Social History     Marital status:        Spouse name: Clinton     Number of children: 2     Years of education: N/A            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 Yes       Father      Social History Narrative            Family History          Family History   Problem Relation Age of Onset     Hypertension Mother       Other Cancer Mother         Lung () & Kidney ()     Depression Mother       C.A.D. Father          age 64 of  ischemic bowel     Colon Polyps Father         benign  "    Diabetes Father          at age 64     Hypertension Father         also high cholesterol     Hyperlipidemia Father       Cerebrovascular Disease Father         ischemic strokes, questionable alzheimers, first cva at 58, first MI in his 40s, 2 CABG procedures for MIs     Cancer Paternal Grandmother       Cardiovascular Paternal Uncle         stroke in his 50s     Diabetes Maternal Grandmother          at age 76     Hypertension Maternal Grandmother       Diabetes Maternal Grandfather         diet at age 76     Hypertension Maternal Grandfather       Anxiety Disorder Sister              ROS: 10 point ROS neg other than the symptoms noted above in the HPI.     BP (!) 145/82 (BP Location: Right arm, Patient Position: Sitting, Cuff Size: Adult Regular)   Pulse 99   Temp 98.5  F (36.9  C) (Oral)   Resp 18   Ht 1.727 m (5' 8\")   Wt 75.3 kg (166 lb 1.6 oz)   SpO2 99%   BMI 25.26 kg/m       Constitutional: alert and no distress.   Neck: Neck supple. No adenopathy. Thyroid symmetric, normal size  HEENT:Normocephalic. No masses, lesions, tenderness or abnormalities. No temporal muscle wasting ENT exam normal, no neck nodes or sinus tenderness. No oral lesions  Cardiovascular: negative, No lifts, heaves, or thrills. RRR. No murmurs, clicks gallops or rub  Respiratory: negative, Good diaphragmatic excursion. Lungs clear. No wheezes or rales  Gastrointestinal: Abdomen soft, non-tender. BS normal.  No masses, organomegaly  Skin: no suspicious lesions or rashes. No spider angiomata or palmar erythema. Nails normal.  Neurologic: Alert and oriented x3. No asterixis or tremor. Gait normal.   Psychiatric:  Appropriate, well groomed.  Hematologic/Lymphatic/Immunologic: Normal cervical and supraclavicular  lymph nodes         Saskia Plascencia MD      "

## 2019-12-02 NOTE — PROGRESS NOTES
A/P  Eileen Valladares is a 49 year old female with an incidental liver lesion, aroung 1.5 cm. Less conspicuous on follow up 2 years later. May be fatty sparking or adenoma. Now off OCPS for last 8 years. No liver disease, UTD on cancer screening, normal liver tests.     No concerning features of the spot or her history. If it is an adenoma, would simply follow for a while.     Will get MRI and review at tumor conference. MRI ordered.     Saskia Plascencia MD    Hepatology/Liver Transplant  Medical Director, Liver Transplantation  [facility]    Appointments [phone]  Clinic Fax [phone]  Transplant Care [phone] Option 4  Transplant Fax [phone]  Administrative Office [phone]  Administrative Fax [phone]  ===================================================================    Subjective  Eileen Valladares is a 49 year old female referred for liver lesion.     Spot in liver dates back to 2016 and is thought to be adenoma or focal fatty infiltration.     She was on OCPs from 15 to 26, then first child at age 28, then 2nd child at 31. Then she was on OCPS for a few years, last time was 8 years ago. Now has a Mirena. No hormone use since OCPs.    She is going on vacation next week. She doesn't know if she wants have the MRI until after.     She will get vascular study follow up in 2021 for follow up of Loeys Becca syndrome.     Imaging done for  MRI 11/16/18  1. Decreased conspicuity of an ill-defined nonenhancing, fat-containing lesion along the capsule in hepatic segment 2, which is most consistent with focal fat deposition, though a small adenoma remains a possibility.  2. Unchanged scattered T2 hyperintense enhancing splenic foci, most consistent with hemangiomas.  3. Decreased size of the previously described enlarged left periaortic  lymph node, which is now nonenlarged and was likely reactive.  4. 8 mm hemorrhagic/proteinaceous cyst in the lower pole  of the right kidney. Scattered simple cysts elsewhere in the kidneys. Mild right pelvocaliectasis similar to prior exams.   5. No arterial aneurysm identified.     No history of liver disease.     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 or symptoms of the syndrome     Temporomandibular joint disorders, unspecified       Uterine fibroid             Social History   Social History            Social History     Marital status:        Spouse name: Clinton     Number of children: 2     Years of education: N/A            Occupational History     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 Yes       Father      Social History Narrative            Family History          Family History   Problem Relation Age of Onset     Hypertension Mother       Other Cancer Mother         Lung () & Kidney ()     Depression Mother       C.A.D. Father          age 64 of  ischemic bowel     Colon Polyps Father         benign     Diabetes Father          at age 64     Hypertension Father         also  "high cholesterol     Hyperlipidemia Father       Cerebrovascular Disease Father         ischemic strokes, questionable alzheimers, first cva at 58, first MI in his 40s, 2 CABG procedures for MIs     Cancer Paternal Grandmother       Cardiovascular Paternal Uncle         stroke in his 50s     Diabetes Maternal Grandmother          at age 76     Hypertension Maternal Grandmother       Diabetes Maternal Grandfather         diet at age 76     Hypertension Maternal Grandfather       Anxiety Disorder Sister              ROS: 10 point ROS neg other than the symptoms noted above in the HPI.     BP (!) 145/82 (BP Location: Right arm, Patient Position: Sitting, Cuff Size: Adult Regular)   Pulse 99   Temp 98.5  F (36.9  C) (Oral)   Resp 18   Ht 1.727 m (5' 8\")   Wt 75.3 kg (166 lb 1.6 oz)   SpO2 99%   BMI 25.26 kg/m      Constitutional: alert and no distress.   Neck: Neck supple. No adenopathy. Thyroid symmetric, normal size  HEENT:Normocephalic. No masses, lesions, tenderness or abnormalities. No temporal muscle wasting ENT exam normal, no neck nodes or sinus tenderness. No oral lesions  Cardiovascular: negative, No lifts, heaves, or thrills. RRR. No murmurs, clicks gallops or rub  Respiratory: negative, Good diaphragmatic excursion. Lungs clear. No wheezes or rales  Gastrointestinal: Abdomen soft, non-tender. BS normal.  No masses, organomegaly  Skin: no suspicious lesions or rashes. No spider angiomata or palmar erythema. Nails normal.  Neurologic: Alert and oriented x3. No asterixis or tremor. Gait normal.   Psychiatric:  Appropriate, well groomed.  Hematologic/Lymphatic/Immunologic: Normal cervical and supraclavicular  lymph nodes       "

## 2019-12-11 ENCOUNTER — TELEPHONE (OUTPATIENT)
Dept: FAMILY MEDICINE | Facility: OTHER | Age: 49
End: 2019-12-11

## 2019-12-11 NOTE — TELEPHONE ENCOUNTER
Summary:    Patient is due/failing the following:   PAP and PHYSICAL    Action needed:   Patient needs office visit for Physical and PAP .    Type of outreach:    Sent [place] message.    Questions for provider review:    None                                                                                                                                    Jessica Flores CMA       Chart routed to Care Team .        Panel Management Review      Patient has the following on her problem list:     Depression / Dysthymia review    Measure:  Needs PHQ-9 score of 4 or less during index window.  Administer PHQ-9 and if score is 5 or more, send encounter to provider for next steps.    PHQ-9 SCORE 11/30/2018 4/2/2019 10/31/2019   PHQ-9 Total Score - - -   PHQ-9 Total Score MyChart 6 (Mild depression) 6 (Mild depression) -   PHQ-9 Total Score 6 6 12       If PHQ-9 recheck is 5 or more, route to provider for next steps.    Patient is due for:  PHQ9      Composite cancer screening  Chart review shows that this patient is due/due soon for the following Pap Smear

## 2019-12-17 ENCOUNTER — ANCILLARY PROCEDURE (OUTPATIENT)
Dept: MRI IMAGING | Facility: CLINIC | Age: 49
End: 2019-12-17
Attending: INTERNAL MEDICINE
Payer: COMMERCIAL

## 2019-12-17 DIAGNOSIS — K76.9 LIVER LESION: ICD-10-CM

## 2019-12-18 NOTE — DISCHARGE INSTRUCTIONS
MRI Contrast Discharge Instructions    The IV contrast you received today will pass out of your body in your  urine. This will happen in the next 24 hours. You will not feel this process.  Your urine will not change color.    Drink at least 4 extra glasses of water or juice today (unless your doctor  has restricted your fluids). This reduces the stress on your kidneys.  You may take your regular medicines.    If you are on dialysis: It is best to have dialysis today.    If you have a reaction: Most reactions happen right away. If you have  any new symptoms after leaving the hospital (such as hives or swelling),  call your hospital at the correct number below. Or call your family doctor.  If you have breathing distress or wheezing, call 911.    Special instructions: ***    I have read and understand the above information.    Signature:______________________________________ Date:___________    Staff:__________________________________________ Date:___________     Time:__________    [facility] Departments:    ___[facility]: [phone]  ___[facility]: [phone]  ___[facility]: [phone] ___[facility]: [phone]  ___[facility]: [phone]  ___[facility]: [phone]  ___[phone]  ___[facility]: [phone]  ___[phone]

## 2019-12-21 ENCOUNTER — MYC MEDICAL ADVICE (OUTPATIENT)
Dept: GASTROENTEROLOGY | Facility: CLINIC | Age: 49
End: 2019-12-21

## 2019-12-23 NOTE — TELEPHONE ENCOUNTER
Please let her konw that I did send a results message 2 days ago and it says it was viewed by her at 9:26 am on 12/21/19. So I don't understand the message.

## 2019-12-27 ENCOUNTER — THERAPY VISIT (OUTPATIENT)
Dept: PHYSICAL THERAPY | Facility: CLINIC | Age: 49
End: 2019-12-27
Payer: COMMERCIAL

## 2019-12-27 ENCOUNTER — HOSPITAL ENCOUNTER (OUTPATIENT)
Dept: MAMMOGRAPHY | Facility: CLINIC | Age: 49
Discharge: HOME | End: 2019-12-27
Attending: OBSTETRICS & GYNECOLOGY | Admitting: OBSTETRICS & GYNECOLOGY
Payer: COMMERCIAL

## 2019-12-27 DIAGNOSIS — R92.30 DENSE BREAST TISSUE: ICD-10-CM

## 2019-12-27 DIAGNOSIS — M25.511 SHOULDER PAIN, RIGHT: ICD-10-CM

## 2019-12-27 PROCEDURE — 97110 THERAPEUTIC EXERCISES: CPT | Mod: GP | Performed by: PHYSICAL THERAPIST

## 2019-12-27 PROCEDURE — 77066 DX MAMMO INCL CAD BI: CPT

## 2019-12-27 PROCEDURE — 97140 MANUAL THERAPY 1/> REGIONS: CPT | Mod: GP | Performed by: PHYSICAL THERAPIST

## 2019-12-27 PROCEDURE — 97112 NEUROMUSCULAR REEDUCATION: CPT | Mod: GP | Performed by: PHYSICAL THERAPIST

## 2019-12-27 PROCEDURE — G0279 TOMOSYNTHESIS, MAMMO: HCPCS

## 2020-02-07 ENCOUNTER — THERAPY VISIT (OUTPATIENT)
Dept: PHYSICAL THERAPY | Facility: CLINIC | Age: 50
End: 2020-02-07
Payer: COMMERCIAL

## 2020-02-07 DIAGNOSIS — M25.511 SHOULDER PAIN, RIGHT: ICD-10-CM

## 2020-02-07 PROCEDURE — 97112 NEUROMUSCULAR REEDUCATION: CPT | Mod: GP | Performed by: PHYSICAL THERAPIST

## 2020-02-07 PROCEDURE — 97110 THERAPEUTIC EXERCISES: CPT | Mod: GP | Performed by: PHYSICAL THERAPIST

## 2020-02-07 PROCEDURE — 97140 MANUAL THERAPY 1/> REGIONS: CPT | Mod: GP | Performed by: PHYSICAL THERAPIST

## 2020-02-08 ENCOUNTER — HEALTH MAINTENANCE LETTER (OUTPATIENT)
Age: 50
End: 2020-02-08

## 2020-03-02 ENCOUNTER — MYC MEDICAL ADVICE (OUTPATIENT)
Dept: INTERNAL MEDICINE | Facility: CLINIC | Age: 50
End: 2020-03-02

## 2020-03-02 NOTE — TELEPHONE ENCOUNTER
[facility] Call Center    Phone Message    May a detailed message be left on voicemail: yes     Reason for Call: Other: .       Pt needs a call back to discuss information about her traveling in [place] (see mychart message) pt is highly worried about this and her employer advised she needs to talk with her PCP or someone within the office to discuss this before she is able to return to work. Please call pt back as soon as possible.     Action Taken: Message routed to:  [facility] PCC    Travel Screening: Not Applicable

## 2020-03-04 ENCOUNTER — MYC MEDICAL ADVICE (OUTPATIENT)
Dept: FAMILY MEDICINE | Facility: OTHER | Age: 50
End: 2020-03-04

## 2020-03-04 ASSESSMENT — PATIENT HEALTH QUESTIONNAIRE - PHQ9
SUM OF ALL RESPONSES TO PHQ QUESTIONS 1-9: 11
10. IF YOU CHECKED OFF ANY PROBLEMS, HOW DIFFICULT HAVE THESE PROBLEMS MADE IT FOR YOU TO DO YOUR WORK, TAKE CARE OF THINGS AT HOME, OR GET ALONG WITH OTHER PEOPLE: SOMEWHAT DIFFICULT
SUM OF ALL RESPONSES TO PHQ QUESTIONS 1-9: 11

## 2020-03-05 ASSESSMENT — PATIENT HEALTH QUESTIONNAIRE - PHQ9: SUM OF ALL RESPONSES TO PHQ QUESTIONS 1-9: 11

## 2020-03-06 ENCOUNTER — THERAPY VISIT (OUTPATIENT)
Dept: PHYSICAL THERAPY | Facility: CLINIC | Age: 50
End: 2020-03-06
Payer: COMMERCIAL

## 2020-03-06 DIAGNOSIS — M25.511 SHOULDER PAIN, RIGHT: ICD-10-CM

## 2020-03-06 PROCEDURE — 97112 NEUROMUSCULAR REEDUCATION: CPT | Mod: GP | Performed by: PHYSICAL THERAPIST

## 2020-03-06 PROCEDURE — 97140 MANUAL THERAPY 1/> REGIONS: CPT | Mod: GP | Performed by: PHYSICAL THERAPIST

## 2020-03-06 PROCEDURE — 97110 THERAPEUTIC EXERCISES: CPT | Mod: GP | Performed by: PHYSICAL THERAPIST

## 2020-03-06 NOTE — TELEPHONE ENCOUNTER
Summary:    Patient is due/failing the following:   Depression follow up,PAP and PHYSICAL    Action needed:   Patient needs office visit for physical .    Type of outreach:    Phone, left message for patient to call back.     Questions for provider review:    None                                                                                                                                    Jessica Flores CMA       Chart routed to Care Team .      Panel Management Review      Patient has the following on her problem list:     Depression / Dysthymia review    Measure:  Needs PHQ-9 score of 4 or less during index window.  Administer PHQ-9 and if score is 5 or more, send encounter to provider for next steps.        PHQ-9 SCORE 4/2/2019 10/31/2019 3/4/2020   PHQ-9 Total Score - - -   PHQ-9 Total Score MyChart 6 (Mild depression) - 11 (Moderate depression)   PHQ-9 Total Score 6 12 11       If PHQ-9 recheck is 5 or more, route to provider for next steps.        Composite cancer screening  Chart review shows that this patient is due/due soon for the following Pap Smear

## 2020-03-06 NOTE — PROGRESS NOTES
Subjective:  HPI  Physical Exam                    Objective:  System    Physical Exam    General     ROS    Assessment/Plan:    PROGRESS  REPORT    Progress reporting period is from 11/8/19 to 3/6/20.     SUBJECTIVE  Pt feels like the exercises are still challenging. Shoulder has been feeling ok - still getting a tightness/discomforting when she reaches up and/or back. Was on vacation for the last couple of weeks so wasn't doing much to stress it.  Current Pain level: 2/10   Initial Pain level: 8/10       OBJECTIVE  R shoulder AROM WNL with exception of 170 degrees flexion  MMT shoulder ER 4/5 on R, pain free  Hypomobile thoracic spine  Requires cues for HEP    ASSESSMENT/PLAN  Diagnosis 1:  R shoulder pain    Pain -  hot/cold therapy, manual therapy, splint/taping/bracing/orthotics, self management, education and home program  Decreased ROM/flexibility - manual therapy, therapeutic exercise and home program  Decreased strength - therapeutic exercise, therapeutic activities and home program  STG/LTGs have been met or progress has been made towards goals:  Yes (See Goal flow sheet completed today.)  Assessment of Progress: The patient's condition has potential to improve.  The patient has had set backs in their progress.  Self Management Plans:  Patient has been instructed in a home treatment program.  Patient  has been instructed in self management of symptoms.  I have re-evaluated this patient and find that the nature, scope, duration and intensity of the therapy is appropriate for the medical condition of the patient.  Eileen continues to require the following intervention to meet STG and LTG's: PT      Recommendations:  This patient would benefit from continued therapy.     Frequency:  1 X a month, once daily  Duration:  for 1 months        Please refer to the daily flowsheet for treatment today, total treatment time and time spent performing 1:1 timed codes.

## 2020-06-09 DIAGNOSIS — L70.0 ACNE VULGARIS: ICD-10-CM

## 2020-06-11 NOTE — TELEPHONE ENCOUNTER
spironolactone (ALDACTONE) 100 MG  Last Written Prescription Date:  4/5/19  Last Fill Quantity: 90,   # refills: 4  Last Office Visit : 4/25/19  Future Office visit:  NONE    Routing refill request to provider for review/approval because:  LAST NOTE   RTC 1 YR  &  RTC 1-2 YRS

## 2020-06-12 RX ORDER — SPIRONOLACTONE 100 MG/1
TABLET, FILM COATED ORAL
Qty: 90 TABLET | OUTPATIENT
Start: 2020-06-12

## 2020-06-18 ENCOUNTER — TELEPHONE (OUTPATIENT)
Dept: DERMATOLOGY | Facility: CLINIC | Age: 50
End: 2020-06-18

## 2020-06-18 ENCOUNTER — VIRTUAL VISIT (OUTPATIENT)
Dept: DERMATOLOGY | Facility: CLINIC | Age: 50
End: 2020-06-18
Payer: COMMERCIAL

## 2020-06-18 DIAGNOSIS — L70.0 ACNE VULGARIS: Primary | ICD-10-CM

## 2020-06-18 RX ORDER — SPIRONOLACTONE 100 MG/1
100 TABLET, FILM COATED ORAL DAILY
Qty: 90 TABLET | Refills: 4 | Status: SHIPPED | OUTPATIENT
Start: 2020-06-18 | End: 2021-10-12

## 2020-06-18 NOTE — PATIENT INSTRUCTIONS
[facility] Teledermatology Visit    Thank you for allowing us to participate in your care. Your findings, instructions and follow-up plan are as follows:  We have refilled spironolactone. We will see you in 6-7 months for follow up. We can evaluate the spot at the left breast at that time.    The alternative to topical tretinoin (Retin-A) is topical adapalene (Differin), which is available over the counter and on [employer].    When should I call my doctor?    If you are worsening or not improving, please, contact us or seek urgent care as noted below.     Who should I call with questions (adults)?    [facility] (adult and pediatric): [phone]     [facility] (adult): [phone]    For urgent needs outside of business hours call the [facility] at [phone] and ask for the dermatology resident on call    If this is a medical emergency and you are unable to reach an ER, Call [phone]      Who should I call with questions (pediatric)?  [facility]- Pediatric Dermatology  Dr. Misty Person, Dr. Olivier Black, Dr. Kim Brush, Olga Dodge, PA  Dr. Rosa Luevano, Dr. Geeta Rolle & Dr. Jed Grubsb  Non Urgent  Nurse Triage Line; [phone]- Hawa and Jacqueline RN Care Coordinators   [place] (/Complex ) [phone]    If you need a prescription refill, please contact your pharmacy. Refills are approved or denied by our Physicians during normal business hours, Monday through Fridays  Per office policy, refills will not be granted if you have not been seen within the past year (or sooner depending on your child's condition)    Scheduling Information:  Pediatric Appointment Scheduling and Call Center [phone]  Radiology Scheduling- [phone]  Sedation Unit Scheduling- [phone]  [place] Scheduling- General [phone]; Pediatric Dermatology  [phone]  Main  Services: [phone]  Frisian: [phone]  Kazakh: [phone]  Hmong/Lithuanian/Rick: [phone]  Preadmission Nursing Department Fax Number: [phone] (Fax all pre-operative paperwork to this number)    For urgent matters arising during evenings, weekends, or holidays that cannot wait for normal business hours please call [phone] and ask for the Dermatology Resident On-Call to be paged.

## 2020-06-18 NOTE — TELEPHONE ENCOUNTER
Called pt and LVM. I wanted to schedule her a 6 month follow up with Dr. Ernst. Clinic number provided.  YAHAIRA Paz

## 2020-06-18 NOTE — NURSING NOTE
"Dermatology Rooming Note    Eileen Valladares's goals for this visit include:   Chief Complaint   Patient presents with     Acne     Eileen is following up on acne- Catalina states \"I need refills\".      Marcia Rojas, RMJUSTICE     "

## 2020-06-18 NOTE — PROGRESS NOTES
[facility]  Store and Forward and Video ( Invitation sent by:  Stephania and text to cell phone: [phone] )      Impression and Recommendations (Patient Counseled on the Following):  1. Acne vulgaris:  - Refilled spironolactone 100 mg daily  - Recommended over-the-counter Differin, if topical tretinoin is too irritating  - Continue with BPO wash daily as needed (she uses this as a spot treatment)    2. Clinically suspected angioma:  - The skin lesion is stable in size and character. No intervention is needed at this time. Onset was many years ago. Overall, the HPI is reassuring.  - Clinically monitor and examine at next in-person visit    Follow-up:   Follow-up with dermatology in approximately 6-7 months. Earlier for new or changing lesions or rash.      Staff and resident:    Dr. Rony Ernst (resident)  Dr. Apurva Culp I, Niyah Culp,  was with the resident on the (phone/video) visit (for the critical and key portions or for 12 minutes) and agree with the resident's findings and plan of care as documented in the resident's note  _____________________________________________________________________________    Dermatology Problem List:    [facility] patient of Dr. Rony Ernst  1. Acne vulgaris  - Current treatment: spironolactone 100 mg every morning only, per patient preference  - Prior treatment: benzoyl peroxide 5% wash in the shower, tretinoin 0.025% cream nightly - discontinued after excess dryness  - S/p ILK 5 mg/mL 0.2 ml to two lesions of left posterior cheek on 1/24/2019  2. Benign nevi, cherry angiomas, dermatofibroma of left thigh  3. Hemangioma of sacrum, resolved with residual vascular stain    Encounter Date: Jun 18, 2020    CC:   Chief Complaint   Patient presents with     Acne     Eileen is following up on acne- Viralkarol states \"I need refills\".        History of Present Illness:  I have reviewed the teledermatology information and the nursing intake corresponding to " "this issue. Eileen Valladares is a 49 year old female who presents via teledermatology for acne vulgaris follow up. The patient says that she is doing well using spironolactone 100 mg daily. She denies a history of kidney disease, low blood pressure, or frequent urination. She had used in the past topical tretinoin, which she says was too strong for her and caused irritation. She denies involvement of the chest and back. She reports a skin lesion of the left breast that looks like a \"bruise.\" Onset was several years ago. She denies any changes and says it is smaller than a pencil eraser.The patient denies any constitutional symptoms, lymphadenopathy, unintentional weight loss or decreased appetite.    ROS: Patient is generally feeling well today.    Physical Examination:  General: Well-appearing, appropriately-developed individual.  Skin: Focused examination within the teledermatology photograph(s) including the face and trunk was performed.   - At the face, there are rare scattered acneiform papules.  - At the left breast, there is a dark purple, small macule without superficial erosion or ulceration.    Labs:  N/A    Past Medical History:   Patient Active Problem List   Diagnosis     GRANULOMA LUNG     CYST KIDNEY     Generalized anxiety disorder     Hyperlipidemia with target LDL less than 130     Temporomandibular joint disorder     Obesity, Class I, BMI 30-34.9     Shoulder pain, right     Moderate episode of recurrent major depressive disorder (H)     Past Medical History:   Diagnosis Date     Anxiety, generalized 2001    controlled with sertraline     Depressive disorder 11/1/2016    Going through divorce     Family history of other specified malignant neoplasm     Mom with primary lung CA and primary renal cell CA     Family history of other specified malignant neoplasm      Female infertility of unspecified origin     Clomid to conceive 2 sons     Fibrocystic Breast Disease      Loeys-Becca syndrome type 2 " 2016    TGFRR2 variant positive, no signs or symptoms of the syndrome     Temporomandibular joint disorders, unspecified      Uterine fibroid 2015     Past Surgical History:   Procedure Laterality Date     BIOPSY Bilateral Breast     benign     COLONOSCOPY       negative     COLONOSCOPY N/A 2019    Procedure: COLONOSCOPY, WITH POLYPECTOMY AND BIOPSY;  Surgeon: Sonia Daily MD;  Location: [facility] OR     DILATION AND CURETTAGE      miscarriage     TONSILLECTOMY & ADENOIDECTOMY      as a child       Social History:  Patient reports that she has never smoked. She has never used smokeless tobacco. She reports current alcohol use of about 1.0 standard drinks of alcohol per week. She reports that she does not use drugs.    Family History:  Family History   Problem Relation Age of Onset     Hypertension Mother      Other Cancer Mother         Lung () & Kidney ()     Depression Mother      C.A.D. Father          age 64 of  ischemic bowel     Colon Polyps Father         benign     Diabetes Father          at age 64     Hypertension Father         also high cholesterol     Hyperlipidemia Father      Cerebrovascular Disease Father         ischemic strokes, questionable alzheimers, first cva at 58, first MI in his 40s, 2 CABG procedures for MIs     Cancer Paternal Grandmother      Cardiovascular Paternal Uncle         stroke in his 50s     Diabetes Maternal Grandmother          at age 76     Hypertension Maternal Grandmother      Diabetes Maternal Grandfather         diet at age 76     Hypertension Maternal Grandfather      Anxiety Disorder Sister        Medications:  Current Outpatient Medications   Medication     buPROPion (WELLBUTRIN XL) 300 MG 24 hr tablet     CALCIUM + D 600-200 MG-UNIT OR TABS     Cholecalciferol (VITAMIN D) 2000 UNITS tablet     MULTI-VITAMIN OR TABS     spironolactone (ALDACTONE) 100 MG tablet     No current facility-administered medications for this visit.            Allergies   Allergen Reactions     No Known Drug Allergies          _____________________________________________________________________________    Teledermatology information:  - Location of patient in [place]: Home  - Patient presented as: return  - Location of teledermatologist:  [facility] D[facility] )  - Reason teledermatology is appropriate:  of National Emergency Regarding Coronavirus disease (COVID 19) Outbreak  - Image quality and interpretability: acceptable  - Physician has received verbal consent for a Video/Photos Visit from the patient? Yes  - In-person dermatology visit recommendation: no  - Date of images: 6/18/2020  - Service start time: 8:30 a.m.  - Service end time: 8:42 a.m.  - Date of report: 6/18/2020

## 2020-06-18 NOTE — LETTER
6/18/2020       RE: Eileen Valladares  [address]  [facility] [address]     Dear Colleague,    Thank you for referring your patient, Eileen Valladares, to the [facility] DERMATOLOGY at [facility]. Please see a copy of my visit note below.    [facility]  Store and Forward and Video ( Invitation sent by:  Amjust[employer] and text to cell phone: [phone] )      Impression and Recommendations (Patient Counseled on the Following):  1. Acne vulgaris:  - Refilled spironolactone 100 mg daily  - Recommended over-the-counter Differin, if topical tretinoin is too irritating  - Continue with BPO wash daily as needed (she uses this as a spot treatment)    2. Clinically suspected angioma:  - The skin lesion is stable in size and character. No intervention is needed at this time. Onset was many years ago. Overall, the HPI is reassuring.  - Clinically monitor and examine at next in-person visit    Follow-up:   Follow-up with dermatology in approximately 6-7 months. Earlier for new or changing lesions or rash.      Staff and resident:    Dr. Rony Ernst (resident)  Dr. Apurva Culp I, Niyah Culp,  was with the resident on the (phone/video) visit (for the critical and key portions or for 12 minutes) and agree with the resident's findings and plan of care as documented in the resident's note  _____________________________________________________________________________    Dermatology Problem List:    [facility] patient of Dr. Rony Ernst  1. Acne vulgaris  - Current treatment: spironolactone 100 mg every morning only, per patient preference  - Prior treatment: benzoyl peroxide 5% wash in the shower, tretinoin 0.025% cream nightly - discontinued after excess dryness  - S/p ILK 5 mg/mL 0.2 ml to two lesions of left posterior cheek on 1/24/2019  2. Benign nevi, cherry angiomas, dermatofibroma of left thigh  3. Hemangioma of sacrum, resolved with residual vascular  "stain    Encounter Date: Jun 18, 2020    CC:   Chief Complaint   Patient presents with     Acne     Eileen is following up on acne- Catalina states \"I need refills\".        History of Present Illness:  I have reviewed the teledermatology information and the nursing intake corresponding to this issue. Eileen Valladares is a 49 year old female who presents via teledermatology for acne vulgaris follow up. The patient says that she is doing well using spironolactone 100 mg daily. She denies a history of kidney disease, low blood pressure, or frequent urination. She had used in the past topical tretinoin, which she says was too strong for her and caused irritation. She denies involvement of the chest and back. She reports a skin lesion of the left breast that looks like a \"bruise.\" Onset was several years ago. She denies any changes and says it is smaller than a pencil eraser.The patient denies any constitutional symptoms, lymphadenopathy, unintentional weight loss or decreased appetite.    ROS: Patient is generally feeling well today.    Physical Examination:  General: Well-appearing, appropriately-developed individual.  Skin: Focused examination within the teledermatology photograph(s) including the face and trunk was performed.   - At the face, there are rare scattered acneiform papules.  - At the left breast, there is a dark purple, small macule without superficial erosion or ulceration.    Labs:  N/A    Past Medical History:   Patient Active Problem List   Diagnosis     GRANULOMA LUNG     CYST KIDNEY     Generalized anxiety disorder     Hyperlipidemia with target LDL less than 130     Temporomandibular joint disorder     Obesity, Class I, BMI 30-34.9     Shoulder pain, right     Moderate episode of recurrent major depressive disorder (H)     Past Medical History:   Diagnosis Date     Anxiety, generalized 2001    controlled with sertraline     Depressive disorder 11/1/2016    Going through divorce     Family history " of other specified malignant neoplasm     Mom with primary lung CA and primary renal cell CA     Family history of other specified malignant neoplasm      Female infertility of unspecified origin     Clomid to conceive 2 sons     Fibrocystic Breast Disease      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 She has never used smokeless tobacco. She reports current alcohol use of about 1.0 standard drinks of alcohol per week. She reports that she does not use drugs.    Family History:  Family History   Problem Relation Age of Onset     Hypertension Mother      Other Cancer Mother         Lung () & Kidney ()     Depression Mother      C.A.D. Father          age 64 of  ischemic bowel     Colon Polyps Father         benign     Diabetes Father          at age 64     Hypertension Father         also high cholesterol     Hyperlipidemia Father      Cerebrovascular Disease Father         ischemic strokes, questionable alzheimers, first cva at 58, first MI in his 40s, 2 CABG procedures for MIs     Cancer Paternal Grandmother      Cardiovascular Paternal Uncle         stroke in his 50s     Diabetes Maternal Grandmother          at age 76     Hypertension Maternal Grandmother      Diabetes Maternal Grandfather         diet at age 76     Hypertension Maternal Grandfather      Anxiety Disorder Sister        Medications:  Current Outpatient Medications    Medication     buPROPion (WELLBUTRIN XL) 300 MG 24 hr tablet     CALCIUM + D 600-200 MG-UNIT OR TABS     Cholecalciferol (VITAMIN D) 2000 UNITS tablet     MULTI-VITAMIN OR TABS     spironolactone (ALDACTONE) 100 MG tablet     No current facility-administered medications for this visit.           Allergies   Allergen Reactions     No Known Drug Allergies          _____________________________________________________________________________    Teledermatology information:  - Location of patient in [place]: Home  - Patient presented as: return  - Location of teledermatologist:  ([facility] DERMATOLOGY )  - Reason teledermatology is appropriate:  of National Emergency Regarding Coronavirus disease (COVID 19) Outbreak  - Image quality and interpretability: acceptable  - Physician has received verbal consent for a Video/Photos Visit from the patient? Yes  - In-person dermatology visit recommendation: no  - Date of images: 6/18/2020  - Service start time: 8:30 a.m.  - Service end time: 8:42 a.m.  - Date of report: 6/18/2020     Again, thank you for allowing me to participate in the care of your patient.      Sincerely,    Sergio Ernst MD

## 2020-11-07 ENCOUNTER — HEALTH MAINTENANCE LETTER (OUTPATIENT)
Age: 50
End: 2020-11-07

## 2020-12-31 ENCOUNTER — HOSPITAL ENCOUNTER (OUTPATIENT)
Dept: MAMMOGRAPHY | Facility: CLINIC | Age: 50
End: 2020-12-31
Attending: OBSTETRICS & GYNECOLOGY
Payer: COMMERCIAL

## 2020-12-31 DIAGNOSIS — R92.30 DENSE BREAST TISSUE: ICD-10-CM

## 2020-12-31 PROCEDURE — G0279 TOMOSYNTHESIS, MAMMO: HCPCS

## 2021-04-07 PROBLEM — M25.511 SHOULDER PAIN, RIGHT: Status: RESOLVED | Noted: 2018-11-05 | Resolved: 2021-04-07

## 2021-05-04 ENCOUNTER — HOSPITAL ENCOUNTER (OUTPATIENT)
Facility: CLINIC | Age: 51
Setting detail: OBSERVATION
Discharge: HOME | End: 2021-05-05
Attending: EMERGENCY MEDICINE | Admitting: OBSTETRICS & GYNECOLOGY
Payer: COMMERCIAL

## 2021-05-04 DIAGNOSIS — Z98.890 STATUS POST HYSTEROSCOPIC MYOMECTOMY: ICD-10-CM

## 2021-05-04 DIAGNOSIS — N93.9 VAGINAL BLEEDING: ICD-10-CM

## 2021-05-04 LAB
ABO + RH BLD: NORMAL
ABO + RH BLD: NORMAL
ANION GAP SERPL CALCULATED.3IONS-SCNC: 4 MMOL/L (ref 3–14)
BASOPHILS # BLD AUTO: 0 10E9/L (ref 0–0.2)
BASOPHILS NFR BLD AUTO: 0.3 %
BLD GP AB SCN SERPL QL: NORMAL
BLOOD BANK CMNT PATIENT-IMP: NORMAL
BUN SERPL-MCNC: 15 MG/DL (ref 7–30)
CALCIUM SERPL-MCNC: 9.1 MG/DL (ref 8.5–10.1)
CHLORIDE SERPL-SCNC: 104 MMOL/L (ref 94–109)
CO2 SERPL-SCNC: 29 MMOL/L (ref 20–32)
CREAT SERPL-MCNC: 0.73 MG/DL (ref 0.52–1.04)
DIFFERENTIAL METHOD BLD: NORMAL
EOSINOPHIL # BLD AUTO: 0.1 10E9/L (ref 0–0.7)
EOSINOPHIL NFR BLD AUTO: 0.5 %
ERYTHROCYTE [DISTWIDTH] IN BLOOD BY AUTOMATED COUNT: 11.8 % (ref 10–15)
GFR SERPL CREATININE-BSD FRML MDRD: >90 ML/MIN/{1.73_M2}
GLUCOSE SERPL-MCNC: 82 MG/DL (ref 70–99)
HCT VFR BLD AUTO: 38.2 % (ref 35–47)
HGB BLD-MCNC: 12.9 G/DL (ref 11.7–15.7)
IMM GRANULOCYTES # BLD: 0.1 10E9/L (ref 0–0.4)
IMM GRANULOCYTES NFR BLD: 0.6 %
LABORATORY COMMENT REPORT: NORMAL
LYMPHOCYTES # BLD AUTO: 2.5 10E9/L (ref 0.8–5.3)
LYMPHOCYTES NFR BLD AUTO: 25.1 %
MCH RBC QN AUTO: 32.3 PG (ref 26.5–33)
MCHC RBC AUTO-ENTMCNC: 33.8 G/DL (ref 31.5–36.5)
MCV RBC AUTO: 96 FL (ref 78–100)
MONOCYTES # BLD AUTO: 0.8 10E9/L (ref 0–1.3)
MONOCYTES NFR BLD AUTO: 7.6 %
NEUTROPHILS # BLD AUTO: 6.6 10E9/L (ref 1.6–8.3)
NEUTROPHILS NFR BLD AUTO: 65.9 %
NRBC # BLD AUTO: 0 10*3/UL
NRBC BLD AUTO-RTO: 0 /100
PLATELET # BLD AUTO: 309 10E9/L (ref 150–450)
POTASSIUM SERPL-SCNC: 3.6 MMOL/L (ref 3.4–5.3)
RBC # BLD AUTO: 4 10E12/L (ref 3.8–5.2)
SARS-COV-2 RNA RESP QL NAA+PROBE: NEGATIVE
SODIUM SERPL-SCNC: 137 MMOL/L (ref 133–144)
SPECIMEN EXP DATE BLD: NORMAL
SPECIMEN SOURCE: NORMAL
WBC # BLD AUTO: 10 10E9/L (ref 4–11)

## 2021-05-04 PROCEDURE — 96375 TX/PRO/DX INJ NEW DRUG ADDON: CPT

## 2021-05-04 PROCEDURE — 99285 EMERGENCY DEPT VISIT HI MDM: CPT | Mod: 25

## 2021-05-04 PROCEDURE — 99284 EMERGENCY DEPT VISIT MOD MDM: CPT | Mod: 25

## 2021-05-04 PROCEDURE — 86900 BLOOD TYPING SEROLOGIC ABO: CPT | Performed by: EMERGENCY MEDICINE

## 2021-05-04 PROCEDURE — 250N000009 HC RX 250: Performed by: EMERGENCY MEDICINE

## 2021-05-04 PROCEDURE — 86850 RBC ANTIBODY SCREEN: CPT | Performed by: EMERGENCY MEDICINE

## 2021-05-04 PROCEDURE — 96374 THER/PROPH/DIAG INJ IV PUSH: CPT

## 2021-05-04 PROCEDURE — G0378 HOSPITAL OBSERVATION PER HR: HCPCS

## 2021-05-04 PROCEDURE — 258N000003 HC RX IP 258 OP 636: Performed by: EMERGENCY MEDICINE

## 2021-05-04 PROCEDURE — 250N000011 HC RX IP 250 OP 636: Performed by: EMERGENCY MEDICINE

## 2021-05-04 PROCEDURE — 86901 BLOOD TYPING SEROLOGIC RH(D): CPT | Performed by: EMERGENCY MEDICINE

## 2021-05-04 PROCEDURE — 80048 BASIC METABOLIC PNL TOTAL CA: CPT | Performed by: EMERGENCY MEDICINE

## 2021-05-04 PROCEDURE — C9803 HOPD COVID-19 SPEC COLLECT: HCPCS

## 2021-05-04 PROCEDURE — 87635 SARS-COV-2 COVID-19 AMP PRB: CPT | Performed by: EMERGENCY MEDICINE

## 2021-05-04 PROCEDURE — 85025 COMPLETE CBC W/AUTO DIFF WBC: CPT | Performed by: EMERGENCY MEDICINE

## 2021-05-04 PROCEDURE — 250N000009 HC RX 250

## 2021-05-04 PROCEDURE — 96361 HYDRATE IV INFUSION ADD-ON: CPT

## 2021-05-04 RX ORDER — MULTIPLE VITAMINS W/ MINERALS TAB 9MG-400MCG
1 TAB ORAL DAILY
COMMUNITY

## 2021-05-04 RX ORDER — WATER 10 ML/10ML
INJECTION INTRAMUSCULAR; INTRAVENOUS; SUBCUTANEOUS
Status: COMPLETED
Start: 2021-05-04 | End: 2021-05-04

## 2021-05-04 RX ADMIN — WATER 10 ML: 1 INJECTION INTRAMUSCULAR; INTRAVENOUS; SUBCUTANEOUS at 21:08

## 2021-05-04 RX ADMIN — TRANEXAMIC ACID 1 G: 100 INJECTION, SOLUTION INTRAVENOUS at 20:32

## 2021-05-04 RX ADMIN — SODIUM CHLORIDE 500 ML: 9 INJECTION, SOLUTION INTRAVENOUS at 20:31

## 2021-05-04 RX ADMIN — CONJUGATED ESTROGENS 25 MG: 25 INJECTION, POWDER, LYOPHILIZED, FOR SOLUTION INTRAMUSCULAR; INTRAVENOUS at 21:01

## 2021-05-04 ASSESSMENT — MIFFLIN-ST. JEOR: SCORE: 1413.74

## 2021-05-05 ENCOUNTER — HOSPITAL PATHOLOGY (OUTPATIENT)
Dept: OTHER | Facility: CLINIC | Age: 51
End: 2021-05-05

## 2021-05-05 VITALS
SYSTOLIC BLOOD PRESSURE: 124 MMHG | OXYGEN SATURATION: 100 % | WEIGHT: 176.81 LBS | DIASTOLIC BLOOD PRESSURE: 65 MMHG | BODY MASS INDEX: 27.75 KG/M2 | TEMPERATURE: 98.5 F | HEART RATE: 85 BPM | RESPIRATION RATE: 16 BRPM | HEIGHT: 67 IN

## 2021-05-05 LAB — HGB BLD-MCNC: 11.1 G/DL (ref 11.7–15.7)

## 2021-05-05 PROCEDURE — 36415 COLL VENOUS BLD VENIPUNCTURE: CPT | Performed by: OBSTETRICS & GYNECOLOGY

## 2021-05-05 PROCEDURE — 85018 HEMOGLOBIN: CPT | Performed by: OBSTETRICS & GYNECOLOGY

## 2021-05-05 PROCEDURE — 250N000011 HC RX IP 250 OP 636: Performed by: OBSTETRICS & GYNECOLOGY

## 2021-05-05 PROCEDURE — G0378 HOSPITAL OBSERVATION PER HR: HCPCS

## 2021-05-05 RX ORDER — DEXTROSE, SODIUM CHLORIDE, SODIUM LACTATE, POTASSIUM CHLORIDE, AND CALCIUM CHLORIDE 5; .6; .31; .03; .02 G/100ML; G/100ML; G/100ML; G/100ML; G/100ML
INJECTION, SOLUTION INTRAVENOUS CONTINUOUS
Status: DISCONTINUED | OUTPATIENT
Start: 2021-05-05 | End: 2021-05-05 | Stop reason: HOSPADM

## 2021-05-05 RX ORDER — ACETAMINOPHEN 325 MG/1
650 TABLET ORAL EVERY 6 HOURS PRN
Status: DISCONTINUED | OUTPATIENT
Start: 2021-05-05 | End: 2021-05-05 | Stop reason: HOSPADM

## 2021-05-05 RX ADMIN — SODIUM CHLORIDE, SODIUM LACTATE, POTASSIUM CHLORIDE, CALCIUM CHLORIDE AND DEXTROSE MONOHYDRATE: 5; 600; 310; 30; 20 INJECTION, SOLUTION INTRAVENOUS at 01:07

## 2021-05-05 ASSESSMENT — MIFFLIN-ST. JEOR: SCORE: 1444.63

## 2021-05-05 NOTE — PLAN OF CARE
"PRIMARY DIAGNOSIS: \"GENERIC\" NURSING  OUTPATIENT/OBSERVATION GOALS TO BE MET BEFORE DISCHARGE:  1. ADLs back to baseline: Yes    2. Activity and level of assistance: Ambulating independently.    3. Pain status: Pain free.    4. Return to near baseline physical activity: Yes     Discharge Planner Nurse   Safe discharge environment identified: Yes  Barriers to discharge: Yes       Entered by: Andrew Kumari 05/05/2021 6:06 AM     Please review provider order for any additional goals.   Nurse to notify provider when observation goals have been met and patient is ready for discharge.  "

## 2021-05-05 NOTE — PLAN OF CARE
Covid negative.  A&Ox4. AVSS. RA.  Denies pain.  D5LR infusing at 125cc/hr.  Regular diet changed to NPO this am.  Independent.  Tylenol PRN.  AM hgb check.  Small light red spotting on pad this am.

## 2021-05-05 NOTE — PLAN OF CARE
"PRIMARY DIAGNOSIS: \"GENERIC\" NURSING  OUTPATIENT/OBSERVATION GOALS TO BE MET BEFORE DISCHARGE:  ADLs back to baseline: Yes    Activity and level of assistance: Ambulating independently.    Pain status: Pain free.    Return to near baseline physical activity: Yes     Discharge Planner Nurse   Safe discharge environment identified: Yes  Barriers to discharge: Yes       Entered by: Andrew Kumari 05/05/2021 2:36 AM     Please review provider order for any additional goals.   Nurse to notify provider when observation goals have been met and patient is ready for discharge.  "

## 2021-05-05 NOTE — ED NOTES
"[facility]  ED Nurse Handoff Report    ED Chief complaint: Vaginal Bleeding      ED Diagnosis:   Final diagnoses:   Status post hysteroscopic myomectomy   Vaginal bleeding       Code Status: Full Code    Allergies:   Allergies   Allergen Reactions     No Known Drug Allergies        Patient Story: Presents with spontaneous vaginal bleeding since 6pm. Soaked 3 pads. Had fibroid surgery on 4/26.     Focused Assessment:  A&Ox4. Vital signs WNL. Soaked 1 pad while in ER.     Treatments and/or interventions provided: Premarin 25mg IV, Tranexamic acid 1g IV, NS 500ml bolus. Labs, pelvic exam.  Patient's response to treatments and/or interventions: stable     To be done/followed up on inpatient unit:  admission orders    Does this patient have any cognitive concerns?: none    Activity level - Baseline/Home:  Independent  Activity Level - Current:   Independent    Patient's Preferred language: English   Needed?: No    Isolation: None  Infection: Not Applicable  Patient tested for COVID 19 prior to admission: YES  Bariatric?: No    Vital Signs:   Vitals:    05/04/21 1945 05/04/21 2015 05/04/21 2030 05/04/21 2100   BP: 135/85 137/74 129/87 114/67   Pulse: 94  81 77   Resp: 16  16 14   Temp: 98.7  F (37.1  C)      TempSrc: Oral      SpO2: 98%  100% 99%   Weight: 77.1 kg (170 lb)      Height: 1.702 m (5' 7\")        Labs Ordered and Resulted from Time of ED Arrival Up to the Time of Departure from the ED   CBC WITH PLATELETS DIFFERENTIAL   BASIC METABOLIC PANEL   SARS-COV-2 (COVID-19) VIRUS RT-PCR   PERIPHERAL IV CATHETER   ORTHOSTATIC BLOOD PRESSURE AND PULSE   ABO/RH TYPE AND SCREEN       Cardiac Rhythm:     Was the PSS-3 completed:   Yes  What interventions are required if any?               Family Comments: none  OBS brochure/video discussed/provided to patient/family: Yes              Name of person given brochure if not patient: na              Relationship to patient: na    For the majority of " the shift this patient's behavior was Green.   Behavioral interventions performed were none.    ED NURSE PHONE NUMBER: *[phone]

## 2021-05-05 NOTE — PHARMACY-ADMISSION MEDICATION HISTORY
Pharmacy Medication History  Admission medication history interview status for the 5/4/2021  admission is complete. See EPIC admission navigator for prior to admission medications     Location of Interview: Patient room  Medication history sources: Patient    Significant changes made to the medication list:  none    In the past week, patient estimated taking medication this percent of the time: greater than 90%    Additional medication history information:   none    Medication reconciliation completed by provider prior to medication history? No    Time spent in this activity: 10min    Prior to Admission medications    Medication Sig Last Dose Taking? Auth Provider   buPROPion (WELLBUTRIN XL) 300 MG 24 hr tablet Take 1 tablet (300 mg) by mouth every morning 5/4/2021 at Unknown time Yes Mamie Pedersen APRN CNP   CALCIUM + D 600-200 MG-UNIT OR TABS Take 1 tablet by mouth daily  5/3/2021 at Unknown time Yes Reported, Patient   Cholecalciferol (VITAMIN D) 2000 UNITS tablet Take 2,000 Units by mouth daily 5/4/2021 at Unknown time Yes Nohemy Bergman PA-C   multivitamin w/minerals (THERA-VIT-M) tablet Take 1 tablet by mouth daily 5/4/2021 at Unknown time Yes Unknown, Entered By History   spironolactone (ALDACTONE) 100 MG tablet Take 1 tablet (100 mg) by mouth daily In the morning 5/4/2021 at Unknown time Yes Niyah Culp MD       The information provided in this note is only as accurate as the sources available at the time of update(s)

## 2021-05-05 NOTE — PLAN OF CARE
AO. VSS on RA. Pt cleared for discharge. Discharge summary/orders reviewed with pt: all questions answered: pt indicated understanding. Pt exited facility with all belongings: Friend for transport.

## 2021-05-05 NOTE — ED NOTES
Assisted Dr. Barnett with pelvic exam. Significant bleeding and clotting noted. Patient subsequently cleaned up and changed.

## 2021-05-05 NOTE — ED PROVIDER NOTES
"  History   Chief Complaint:  Vaginal Bleeding       HPI   Eileen Valladares is a 50 year old female with history of fibroid removal, obesity, and hyperlipidemia who presents with vaginal bleeding. On April 26th she had two fibroids removed and they were found to be atypical. She then presented at her doctors office yesterday to discuss a hysterectomy. Tonight around 1810, she developed heavy onset vaginal bleeding which bled through her pants. She has only taken 2 ibuprofen today and denies any blood thinners.    Review of Systems   Genitourinary: Positive for vaginal bleeding.   All other systems reviewed and are negative.      Allergies:  The patient has no known allergies.     Medications:  Wellbutrin XL  Aldactone  Cholecalciferol    Past Medical History:    Anxiety  Depression  Female infertility  Fibrocystic breast disease  Loeys-Becca syndrome type 2  Temporomandibular joint disorder  Uterine fibroid  Obesity  Hyperlipidemia     Past Surgical History:    Biopsy  Colonoscopy  Dilation and curettage  Tonsillectomy and adenoidectomy  Fibroid removal     Family History:    Hypertension  Lung cancer  Depression  CAD  Colon polyps  Diabetes  Hypertension  Hyperlipidemia  Cerebrovascular disease  Cancer  Anxiety    Social History:  Patient came from home.  Patient had a recent surgery.    Physical Exam     Patient Vitals for the past 24 hrs:   BP Temp Temp src Pulse Resp SpO2 Height Weight   05/04/21 2100 114/67 -- -- 77 14 99 % -- --   05/04/21 2030 129/87 -- -- 81 16 100 % -- --   05/04/21 2015 137/74 -- -- -- -- -- -- --   05/04/21 1945 135/85 98.7  F (37.1  C) Oral 94 16 98 % 1.702 m (5' 7\") 77.1 kg (170 lb)       Physical Exam  Vitals signs reviewed.   Cardiovascular:      Rate and Rhythm: Normal rate and regular rhythm.   Pulmonary:      Effort: Pulmonary effort is normal.      Breath sounds: Normal breath sounds.   Abdominal:      General: Abdomen is flat.      Comments: Minimal suprapubic tenderness no " guarding or rebound   Genitourinary:     Comments: There are moderate clots in the vaginal vault there is continued slight oozing from the cervix.  Musculoskeletal: Normal range of motion.   Skin:     General: Skin is warm.      Capillary Refill: Capillary refill takes less than 2 seconds.   Neurological:      General: No focal deficit present.      Mental Status: She is alert.   Psychiatric:         Mood and Affect: Mood normal.           Emergency Department Course     Laboratory:    CBC: WBC 10.0, HGB 12.9,      BMP: o/w WNL (Creatinine 0.73)     ABO RH Type and Screen: A-, antibody Negative    Asymptomatic COVID19 Virus PCR by nasopharyngeal swab: Pending    Emergency Department Course:    Reviewed:  I reviewed nursing notes, vitals and past medical history    Assessments:  2005 I obtained history and examined the patient as noted above.   2115 I rechecked the patient and explained findings.   2148 I rechecked the patient.    Consults:   2202 I consulted with Dr. Real from the hospitalist service and discussed patient findings and current plan of care. They accepted care of the patient.    Interventions:  2031  mL IV  2032 Cyklokapron 1 g IV  2101 Premarin 25 mg IV    Disposition:  The patient was admitted to the hospital under the care of Dr. Real.       Impression & Plan       Medical Decision Making:  Patient presents with sudden and in moderate to bleeding after post fibroidectomy.  Biopsies do reveal bizarre cells and clinical concerns and history is in consistent with likely post surgical bleeding.  In the emergency room patient's lab work shows a stable hemoglobin.  Patient continued to have a moderate amount of bleeding care was discussed with the on-call gynecologist Dr. Tawnya real who recommends IV Premarin and TXA.  This was performed and patient was observed in the emergency room for now patient continued to have some mild to moderate bleeding and recommendations for treatment and  observation for serial hemoglobins.  Patient was admitted to the gynecological service in stable condition.    Covid-19  Eileen Valladares was evaluated during a global COVID-19 pandemic, which necessitated consideration that the patient might be at risk for infection with the SARS-CoV-2 virus that causes COVID-19.   Applicable protocols for evaluation were followed during the patient's care.   COVID-19 was considered as part of the patient's evaluation. The plan for testing is:  a test was obtained during this visit.    Diagnosis:    ICD-10-CM    1. Status post hysteroscopic myomectomy  Z98.890 Asymptomatic SARS-CoV-2 COVID-19 Virus (Coronavirus) by PCR   2. Vaginal bleeding  N93.9        Discharge Medications:  New Prescriptions    No medications on file       Scribe Disclosure:  I, Deny Auguste, am serving as a scribe at 7:52 PM on 5/4/2021 to document services personally performed by Justin Barnett MD based on my observations and the provider's statements to me.            Justin Barnett MD  05/06/21 [id number]

## 2021-05-05 NOTE — PROGRESS NOTES
.RECEIVING UNIT ED HANDOFF REVIEW    ED Nurse Handoff Report was reviewed by: Andrew Kumari RN on May 4, 2021 at 11:57 PM

## 2021-05-07 ENCOUNTER — TELEPHONE (OUTPATIENT)
Dept: FAMILY MEDICINE | Facility: CLINIC | Age: 51
End: 2021-05-07

## 2021-05-07 LAB — COPATH REPORT: NORMAL

## 2021-05-07 NOTE — TELEPHONE ENCOUNTER
ED / Discharge Outreach Protocol    Patient Contact    Attempt # 1    Was call answered?  No.  Left message on voicemail with information to call me back.    Elmer Partida, BECKAN, RN, PHN  [facility] ~ Registered Nurse  Clinic Triage ~ [facility]  May 7, 2021

## 2021-05-07 NOTE — TELEPHONE ENCOUNTER
Reason for follow up call: Eileen Valladares appeared on our list for being seen in and recenlty discharge from the Emergency Room/In Patient Hospital Admission.    Chief Complaint   Patient presents with     Hospital F/U   Chief Complaint: Status Post Hysteroscopic Myomectomy    Encounter routed for Clinic Triage RN to call for follow up    Wendy Barrios RN, BSN  [place]  May 7, 2021

## 2021-05-10 NOTE — TELEPHONE ENCOUNTER
ED / Discharge Outreach Protocol    Patient Contact    Attempt # 2    Was call answered?  No.  Left message on voicemail with information to call me back.    Elmer Partida, BECKAN, RN, PHN  [facility] ~ Registered Nurse  Clinic Triage ~ [place] [facility]  May 10, 2021

## 2021-09-05 ENCOUNTER — HEALTH MAINTENANCE LETTER (OUTPATIENT)
Age: 51
End: 2021-09-05

## 2021-09-10 DIAGNOSIS — Q87.89 LOEYS-DIETZ SYNDROME: Primary | ICD-10-CM

## 2021-09-15 ENCOUNTER — E-VISIT (OUTPATIENT)
Dept: URGENT CARE | Facility: URGENT CARE | Age: 51
End: 2021-09-15
Payer: COMMERCIAL

## 2021-09-15 DIAGNOSIS — Z20.822 SUSPECTED COVID-19 VIRUS INFECTION: Primary | ICD-10-CM

## 2021-09-15 PROCEDURE — 99421 OL DIG E/M SVC 5-10 MIN: CPT | Performed by: PHYSICIAN ASSISTANT

## 2021-09-15 NOTE — PATIENT INSTRUCTIONS
Dear Eileen Valladares,    Your symptoms show that you may have coronavirus (COVID-19). This illness can cause fever, cough and trouble breathing. Many people get a mild case and get better on their own. Some people can get very sick.    Will I be tested for COVID-19?  We would like to test you for Covid-19 virus. I have placed orders for this test.     For all employees or close contacts (except [facility] and [facility] - see below), go to your [employer] home page and scroll down to the section that says  You have an appointment that needs to be scheduled  and click the large green button that says  Schedule Now  and follow the steps to find the next available opening.     If you are unable to complete these steps or if you cannot find any available times, please call [phone] to schedule employee testing.     [facility] employees or close contacts, please call [phone].   [facility]) employees or close contacts call [phone].    Return to work/school/ guidance:  Please let your workplace manager and staffing office know when your quarantine ends     We can t give you an exact date as it depends on the above. You can calculate this on your own or work with your manager/staffing office to calculate this. (For example if you were exposed on 10/4, you would have to quarantine for 14 full days. That would be through 10/18. You could return on 10/19.)      If you receive a positive COVID-19 test result, follow the guidance of the those who are giving you the results. Usually the return to work is 10 (or in some cases 20 days from symptom onset.) If you work at [employer], you must also be cleared by Employee Occupational Health and Safety to return to work.        If you receive a negative COVID-19 test result and did not have a high risk exposure to someone with a known positive COVID-19 test, you can return to work once you're free of fever for 24 hours without fever-reducing medication  and your symptoms are improving or resolved.      If you receive a negative COVID-19 test and If you had a high risk exposure to someone who has tested positive for COVID-19 then you can return to work 14 days after your last contact with the positive individual    Note: If you have ongoing exposure to the covid positive person, this quarantine period may be more than 14 days. (For example, if you are continued to be exposed to your child who tested positive and cannot isolate from them, then the quarantine of 7-14 days can't start until your child is no longer contagious. This is typically 10 days from onset of the child's symptoms. So the total duration may be 17-24 days in this case.)    Sign up for [employer].   We know it's scary to hear that you might have COVID-19. We want to track your symptoms to make sure you're okay over the next 2 weeks. Please look for an email from [employer]--this is a free, online program that we'll use to keep in touch. To sign up, follow the link in the email you will receive. Learn more at [email or web address]    How can I take care of myself?    Get lots of rest. Drink extra fluids (unless a doctor has told you not to)    Take Tylenol (acetaminophen) or ibuprofen for fever or pain. If you have liver or kidney problems, ask your family doctor if it's okay to take Tylenol o ibuprofen    If you have other health problems (like cancer, heart failure, an organ transplant or severe kidney disease): Call your specialty clinic if you don't feel better in the next 2 days.    Know when to call 911. Emergency warning signs include:  o Trouble breathing or shortness of breath  o Pain or pressure in the chest that doesn't go away  o Feeling confused like you haven't felt before, or not being able to wake up  o Bluish-colored lips or face    Where can I get more information?   [facility] - About COVID-19:   [email or web address]    CDC - What to Do If You're  Sick:   www.cdc.gov/coronavirus/2019-ncov/about/steps-when-sick.html    September 15, 2021  RE:  Eileen Valladares                                                                                                                  [address]  [place] [address]      To whom it may concern:    I evaluated Eileen Valladares on September 15, 2021. Eileen Valladares should be excused from work/school.     They should let their workplace manager and staffing office know when their quarantine ends.    We can not give an exact date as it depends on the information below. They can calculate this on their own or work with their manager/staffing office to calculate this. (For example if they were exposed on 10/04, they would have to quarantine for 14 full days. That would be through 10/18. They could return on 10/19.)    Quarantine Guidelines:      If patient receives a positive COVID-19 test result, they should follow the guidance of those who are giving the results. Usually the return to work is 10 (or in some cases 20 days from symptom onset.) If they work at [employer], they must be cleared by Employee Occupational Health and Safety to return to work.        If patient receives a negative COVID-19 test result and did not have a high risk exposure to someone with a known positive COVID-19 test, they can return to work once they're free of fever for 24 hours without fever-reducing medication and their symptoms are improving or resolved.      If patient receives a negative COVID-19 test and if they had a high risk exposure to someone who has tested positive for COVID-19 then they can return to work 14 days after their last contact with the positive individual    Note: If there is ongoing exposure to the covid positive person, this quarantine period may be longer than 14 days. (For example, if they are continually exposed to their child, who tested positive and cannot isolate from them, then the quarantine of 7-14 days can't  start until their child is no longer contagious. This is typically 10 days from onset to the child's symptoms. So the total duration may be 17-24 days in this case.)     Sincerely,  Saskia Harrell PA-C

## 2021-09-16 ENCOUNTER — LAB (OUTPATIENT)
Dept: URGENT CARE | Facility: URGENT CARE | Age: 51
End: 2021-09-16
Attending: PHYSICIAN ASSISTANT
Payer: COMMERCIAL

## 2021-09-16 DIAGNOSIS — Z20.822 SUSPECTED COVID-19 VIRUS INFECTION: ICD-10-CM

## 2021-09-16 LAB — SARS-COV-2 RNA RESP QL NAA+PROBE: POSITIVE

## 2021-09-16 PROCEDURE — U0005 INFEC AGEN DETEC AMPLI PROBE: HCPCS

## 2021-09-16 PROCEDURE — U0003 INFECTIOUS AGENT DETECTION BY NUCLEIC ACID (DNA OR RNA); SEVERE ACUTE RESPIRATORY SYNDROME CORONAVIRUS 2 (SARS-COV-2) (CORONAVIRUS DISEASE [COVID-19]), AMPLIFIED PROBE TECHNIQUE, MAKING USE OF HIGH THROUGHPUT TECHNOLOGIES AS DESCRIBED BY CMS-2020-01-R: HCPCS

## 2021-10-07 DIAGNOSIS — L70.0 ACNE VULGARIS: ICD-10-CM

## 2021-10-10 RX ORDER — SPIRONOLACTONE 100 MG/1
TABLET, FILM COATED ORAL
Qty: 90 TABLET | Refills: 4 | OUTPATIENT
Start: 2021-10-10

## 2021-10-10 NOTE — TELEPHONE ENCOUNTER
spironolactone (ALDACTONE) 100 MG tablet  Last Written Prescription Date:  6/18/20  Last Fill Quantity: 90,   # refills: 4  Last Office Visit : 6/18/20  Future Office visit:  none    Scheduling has been notified to contact the pt for appointment.

## 2021-10-12 DIAGNOSIS — L70.0 ACNE VULGARIS: ICD-10-CM

## 2021-10-12 RX ORDER — SPIRONOLACTONE 100 MG/1
100 TABLET, FILM COATED ORAL DAILY
Qty: 90 TABLET | Refills: 4 | Status: SHIPPED | OUTPATIENT
Start: 2021-10-12 | End: 2022-02-03

## 2021-10-12 NOTE — TELEPHONE ENCOUNTER
1. Acne vulgaris:  - Refilled spironolactone 100 mg daily  - Recommended over-the-counter Differin, if topical tretinoin is too irritating  - Continue with BPO wash daily as needed (she uses this as a spot treatment)     2. Clinically suspected angioma:  - The skin lesion is stable in size and character. No intervention is needed at this time. Onset was many years ago. Overall, the HPI is reassuring.  - Clinically monitor and examine at next in-person visit     Follow-up:   Follow-up with dermatology in approximately 6-7 months. Earlier for new or changing lesions or rash.       NOV scheduled for 1/6/2022

## 2021-11-01 NOTE — PATIENT INSTRUCTIONS
You were seen today in the Adult Congenital and Cardiovascular Genetics Clinic at the [facility].    Cardiology Providers you saw during your visit:  MARC Appiah MD    Diagnosis:  Loey's Becca Syndrome    Results:  MARC Appiah MD reviewed the results of your ECHO testing today in clinic.    Recommendations:    1. Continue to eat a heart healthy, low salt diet.  2. Continue to get 20-30 minutes of aerobic activity, 4-5 days per week.  Examples of aerobic activity include walking, running, swimming, cycling, etc.  3. Continue to observe good oral hygiene, with regular dental visits.      SBE prophylaxis:   Yes____  No__X__    Lifelong Bacterial Endocarditis Prophylaxis:  YES____  NO____    If YES is checked, follow the recommendations outlined below:  1. Take antibiotic(s) prior to recommended dental procedures and procedures on the respiratory tract or with infected skin, muscle or bones. SBE prophylaxis is not needed for routine GI and  procedures (ie. Colonoscopy or vaginal delivery)  2. Observe good oral hygiene daily, as advised by your dentist. Get regular professional dental care.  3. Keep cuts clean.  4. Infections should be treated promptly.  5. Symptoms of Infective Endocarditis could include: fever lasting more than 4-5 days or a recurrent fever that initially resolves but returns within 1-2 days)      Exercise restrictions:   Yes__X__  No____         If yes, list restrictions:  Must be allowed to rest if fatigued or SOB      Work restrictions:  Yes____  No_X___         If yes, list restrictions:    FASTING CHOLESTEROL was checked in the last 5 years YES_X__  NO___ (2019)  Continue to eat a heart healthy, low salt diet.         ____ Fasting lipid panel order today         ____ No changes in medications          ____ I recommend the following changes in your cholesterol medications.:          ____ Please follow up for cholesterol screening at your primary care  physician      Follow-up:  Follow up in 5 years with Dr. Appiah with a MRA of your chest, abdomen and pelvis.    If you have questions or concerns please contact us at:    Stephanie Frederick, MPH, RN, BSN  Ros Oseguera (Scheduling)  Nurse Care Coordinator     Clinic   Adult Congenital and CV Genetics   Adult Congenital and CV Genetic  [facility]   [facility]  (P) [phone]     (P) [phone]  [email or web address]  (F) [phone]        For after hours urgent needs, call [phone] and ask to speak to the Adult Congenital Physician on call.  Mention Job Code 0401.    For emergencies call 911.    [facility]  [facility]  Mail Code [id number]  [address] [place]  [address]

## 2021-11-05 ENCOUNTER — OFFICE VISIT (OUTPATIENT)
Dept: CARDIOLOGY | Facility: CLINIC | Age: 51
End: 2021-11-05
Attending: INTERNAL MEDICINE
Payer: COMMERCIAL

## 2021-11-05 VITALS
HEIGHT: 67 IN | BODY MASS INDEX: 27.78 KG/M2 | WEIGHT: 177 LBS | SYSTOLIC BLOOD PRESSURE: 117 MMHG | HEART RATE: 88 BPM | DIASTOLIC BLOOD PRESSURE: 79 MMHG | OXYGEN SATURATION: 99 %

## 2021-11-05 DIAGNOSIS — Q87.89 LOEYS-DIETZ SYNDROME: ICD-10-CM

## 2021-11-05 DIAGNOSIS — Q87.89 LOEYS-DIETZ SYNDROME: Primary | ICD-10-CM

## 2021-11-05 LAB — LVEF ECHO: NORMAL

## 2021-11-05 PROCEDURE — 99215 OFFICE O/P EST HI 40 MIN: CPT | Mod: 25 | Performed by: INTERNAL MEDICINE

## 2021-11-05 PROCEDURE — G0463 HOSPITAL OUTPT CLINIC VISIT: HCPCS | Mod: 25

## 2021-11-05 PROCEDURE — 93005 ELECTROCARDIOGRAM TRACING: CPT

## 2021-11-05 PROCEDURE — 93306 TTE W/DOPPLER COMPLETE: CPT | Performed by: INTERNAL MEDICINE

## 2021-11-05 ASSESSMENT — MIFFLIN-ST. JEOR: SCORE: 1441.24

## 2021-11-05 ASSESSMENT — PAIN SCALES - GENERAL: PAINLEVEL: NO PAIN (0)

## 2021-11-05 NOTE — NURSING NOTE
Chief Complaint   Patient presents with     Follow Up     51 year old female with history of Loey's Becca Syndrome presenting for evaluation.     Vitals were taken, medications reconciled, and EKG performed.    Brandon Gary  2:02 PM

## 2021-11-05 NOTE — LETTER
11/5/2021      RE: Eileen Valladares  [address]  [facility] [address]       Dear Colleague,    Thank you for the opportunity to participate in the care of your patient, Eileen Valladares, at the [facility] [place] [facility]. Please see a copy of my visit note below.    [id number] CONSULTATION:    It was a pleasure to see Eileen Valladares in Adult Congenital and Cardiovascular Genetics Clinic at the [facility].  She is a pleasant, 51-year-old woman who has a past medical history significant for  anxiety.  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      I met her in 2016 when her son was evaluated by Yoli Barry for possible connective tissue disease.  Echo showed redundant valve tissue of the mitral valve with trivial insufficiency and no prolapse and a normal aortic root, but given his hyperflexible fingers, pectus and redundant valve tissue, he was recommended to have genetic testing and was found to have a variant of uncertain clinical significance in the TGFBR2 gene ([id number]). This variant was reported in one individual with Loeys-Becca phenotype; however, this individual had an uninfected father who carried the same variant, so we did not know whether this particular variant is pathogenic or benign.  Family members were recommended to have testing.  His mom tested positive.  Her testing was performed on 04/04/2016 and showed the same variant as her son in the TGFBR2 gene.  She underwent an evaluation with an MR of the head, neck, chest, abdomen and pelvis.  This showed no evidence of aneurysm in any of these areas; however, on her MR of the abdomen and pelvis, she had an indeterminate, 9 mm lesion on the spleen as well as an 11 mm left hepatic lobe lesion that were inadequately characterized.  A dedicated MR of the abdomen was recommended to evaluate these further and these were not felt to be of concern.      She  did have Covid despite 2 vaccines, but recovered OK.  She has some stress with her son.  She has a significant other. Echo this visit showed no concerning findings. She had a hysterectomy since I last saw her.     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, no signs or symptoms of the syndrome     Temporomandibular joint disorders, unspecified      Uterine fibroid 2015       CURRENT MEDICATIONS:  Current Outpatient Medications   Medication Sig Dispense Refill     buPROPion (WELLBUTRIN XL) 300 MG 24 hr tablet Take 1 tablet (300 mg) by mouth every morning 90 tablet 1     CALCIUM + D 600-200 MG-UNIT OR TABS Take 1 tablet by mouth daily        Cholecalciferol (VITAMIN D) 2000 UNITS tablet Take 2,000 Units by mouth daily 100 tablet 3     multivitamin w/minerals (THERA-VIT-M) tablet Take 1 tablet by mouth daily       spironolactone (ALDACTONE) 100 MG tablet Take 1 tablet (100 mg) by mouth daily In the morning 90 tablet 4       PAST SURGICAL HISTORY:  Past Surgical History:   Procedure Laterality Date     BIOPSY Bilateral Breast 2001    benign     COLONOSCOPY  12/02     negative     COLONOSCOPY N/A 11/25/2019    Procedure: COLONOSCOPY, WITH POLYPECTOMY AND BIOPSY;  Surgeon: Sonia Daily MD;  Location: [facility] OR     DILATION AND CURETTAGE  1997    miscarriage     TONSILLECTOMY & ADENOIDECTOMY      as a child       ALLERGIES  No known drug allergies    FAMILY HX:  Family History   Problem Relation Age of Onset     Hypertension Mother      Other Cancer  Mother         Lung () & Kidney ()     Depression Mother      C.A.D. Father          age 64 of  ischemic bowel     Colon Polyps Father         benign     Diabetes Father          at age 64     Hypertension Father         also high cholesterol     Hyperlipidemia Father      Cerebrovascular Disease Father         ischemic strokes, questionable alzheimers, first cva at 58, first MI in his 40s, 2 CABG procedures for MIs     Cancer Paternal Grandmother      Cardiovascular Paternal Uncle         stroke in his 50s     Diabetes Maternal Grandmother          at age 76     Hypertension Maternal Grandmother      Diabetes Maternal Grandfather         diet at age 76     Hypertension Maternal Grandfather      Anxiety Disorder Sister    Dad had MIs in his 40s; however, he was an uncontrolled diabetic.  He  in his 60s after bilateral BKAs and system failure.  Her mom is living.  She has a history of nonsmoker lung cancer and kidney cancer.  She has a sister who is 4 years older and a younger sister who are healthy.  She has one other son.      SOCIAL HX:  Social History     Socioeconomic History     Marital status:      Spouse name: Clinton     Number of children: 2     Years of education: Not on file     Highest education level: Not on file   Occupational History     Occupation: Pharmacist     Employer: [employer]     Comment: [employer]   Tobacco Use     Smoking status: Never Smoker     Smokeless tobacco: Never Used   Substance and Sexual Activity     Alcohol use: Yes     Alcohol/week: 1.0 standard drinks     Types: 1 Standard drinks or equivalent per week     Comment: 1 drink per week     Drug use: No     Sexual activity: Yes     Partners: Male     Birth control/protection: Male Surgical   Other Topics Concern     Parent/sibling w/ CABG, MI or angioplasty before 65F 55M? Yes     Comment: Father   Social History Narrative     Not on file     Social Determinants of Health     Financial Resource  "Strain:      Difficulty of Paying Living Expenses:    Food Insecurity:      Worried About Running Out of Food in the Last Year:      Ran Out of Food in the Last Year:    Transportation Needs:      Lack of Transportation (Medical):      Lack of Transportation (Non-Medical):    Physical Activity:      Days of Exercise per Week:      Minutes of Exercise per Session:    Stress:      Feeling of Stress :    Social Connections:      Frequency of Communication with Friends and Family:      Frequency of Social Gatherings with Friends and Family:      Attends Taoism Services:      Active Member of Clubs or Organizations:      Attends Club or Organization Meetings:      Marital Status:    Intimate Partner Violence:      Fear of Current or Ex-Partner:      Emotionally Abused:      Physically Abused:      Sexually Abused:    She is .  No tobacco use.    ROS:  Constitutional: No fever, chills, or sweats. No weight gain/loss.   ENT: No visual disturbance, ear ache, epistaxis, sore throat.   Allergies/Immunologic: Negative.   Respiratory: No cough, hemoptysis.   Cardiovascular: As per HPI.   GI: No nausea, vomiting, hematemesis, melena, or hematochezia.   : No urinary frequency, dysuria, or hematuria.   Integument: Negative.   Psychiatric: Negative.   Neuro: Negative.   Endocrinology: Negative.   Musculoskeletal: No myalgia.    VITAL SIGNS:  /79 (BP Location: Right arm, Patient Position: Chair, Cuff Size: Adult Regular)   Pulse 88   Ht 1.703 m (5' 7.05\")   Wt 80.3 kg (177 lb)   SpO2 99%   BMI 27.68 kg/m    Body mass index is 27.68 kg/m .  Wt Readings from Last 2 Encounters:   11/05/21 80.3 kg (177 lb)   05/05/21 80.2 kg (176 lb 12.9 oz)       PHYSICAL EXAM  Eileen Valladares IS A 51 year old female.in no acute distress.  HEENT: Unremarkable.  Neck: JVP normal.  Carotids +4/4 bilaterally without bruits.  Lungs: CTA.  Cor: RRR. Normal S1 and S2.  No murmur, rub, or gallop.  PMI in Lf 5th ICS..  Extremities: No " C/C/E.    Neuro: Grossly intact.    LABS    Lab Results   Component Value Date    WBC 10.0 05/04/2021     Lab Results   Component Value Date    RBC 4.00 05/04/2021     Lab Results   Component Value Date    HGB 11.1 05/05/2021     Lab Results   Component Value Date    HCT 38.2 05/04/2021     No components found for: MCT  Lab Results   Component Value Date    MCV 96 05/04/2021     Lab Results   Component Value Date    MCH 32.3 05/04/2021     Lab Results   Component Value Date    MCHC 33.8 05/04/2021     Lab Results   Component Value Date    RDW 11.8 05/04/2021     Lab Results   Component Value Date     05/04/2021      Recent Labs   Lab Test 05/04/21 2015 12/02/19  1508    136   POTASSIUM 3.6 4.1   CHLORIDE 104 104   CO2 29 28   ANIONGAP 4 4   GLC 82 76   BUN 15 9   CR 0.73 0.61   DANIELLA 9.1 9.5     Recent Labs   Lab Test 10/31/19  0942 09/17/15  0803   CHOL 208* 202*   HDL 82 74   * 118   TRIG 51 52   CHOLHDLRATIO  --  2.7   NHDL 125  --       Assessment and Plan:   ASSESSMENT AND PLAN:   1.  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  However, this individual had an unaffected father who carried the same variant.   2.  A 20-year-old son also with the variant.    3.  No evidence of aneurysm on MRA of the head, neck, chest, abdomen and pelvis.     4.  A 9 mm indeterminate lesion in the spleen and an 11 mm left hepatic lobe lesion  5.  Anxiety.      DISCUSSION:  It was a pleasure to see Ms. Valladares in Cardiology Clinic today.     She is doing well from CV standpoint with no concerning symptoms. Her echo is stable. Her BP is controlled.  She will work on diet and exercise.    We discussed follow up in 5 years with MRA of chest abdomen and pelvis.    She knows to call us with any questions or concerns before we see her back.  It was certainly a pleasure to see her today.  Please do not hesitate to contact me with any  questions or concerns.         MARC Appiah MD [facility]  Adult Congenital and Interventional Cardiology   [facility]  [phone]  46 minutes face-face, documentation and review of records on day of visit      Please do not hesitate to contact me if you have any questions/concerns.     Sincerely,     Jose Appiah MD

## 2021-11-05 NOTE — PROGRESS NOTES
[id number] CONSULTATION:    It was a pleasure to see Eileen Valladares in Adult Congenital and Cardiovascular Genetics Clinic at the [facility].  She is a pleasant, 51-year-old woman who has a past medical history significant for  anxiety.  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      I met her in 2016 when her son was evaluated by Yoli Berenetta Barry for possible connective tissue disease.  Echo showed redundant valve tissue of the mitral valve with trivial insufficiency and no prolapse and a normal aortic root, but given his hyperflexible fingers, pectus and redundant valve tissue, he was recommended to have genetic testing and was found to have a variant of uncertain clinical significance in the TGFBR2 gene ([id number]). This variant was reported in one individual with Loeys-Becca phenotype; however, this individual had an uninfected father who carried the same variant, so we did not know whether this particular variant is pathogenic or benign.  Family members were recommended to have testing.  His mom tested positive.  Her testing was performed on 04/04/2016 and showed the same variant as her son in the TGFBR2 gene.  She underwent an evaluation with an MR of the head, neck, chest, abdomen and pelvis.  This showed no evidence of aneurysm in any of these areas; however, on her MR of the abdomen and pelvis, she had an indeterminate, 9 mm lesion on the spleen as well as an 11 mm left hepatic lobe lesion that were inadequately characterized.  A dedicated MR of the abdomen was recommended to evaluate these further and these were not felt to be of concern.      She did have Covid despite 2 vaccines, but recovered OK.  She has some stress with her son.  She has a significant other. Echo this visit showed no concerning findings. She had a hysterectomy since I last saw her.     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, no signs or symptoms of the syndrome     Temporomandibular joint disorders, unspecified      Uterine fibroid        CURRENT MEDICATIONS:  Current Outpatient Medications   Medication Sig Dispense Refill     buPROPion (WELLBUTRIN XL) 300 MG 24 hr tablet Take 1 tablet (300 mg) by mouth every morning 90 tablet 1     CALCIUM + D 600-200 MG-UNIT OR TABS Take 1 tablet by mouth daily        Cholecalciferol (VITAMIN D) 2000 UNITS tablet Take 2,000 Units by mouth daily 100 tablet 3     multivitamin w/minerals (THERA-VIT-M) tablet Take 1 tablet by mouth daily       spironolactone (ALDACTONE) 100 MG tablet Take 1 tablet (100 mg) by mouth daily In the morning 90 tablet 4       PAST SURGICAL HISTORY:  Past Surgical History:   Procedure Laterality Date     BIOPSY Bilateral Breast     benign     COLONOSCOPY       negative     COLONOSCOPY N/A 2019    Procedure: COLONOSCOPY, WITH POLYPECTOMY AND BIOPSY;  Surgeon: Sonia Daily MD;  Location: [facility] OR     DILATION AND CURETTAGE      miscarriage     TONSILLECTOMY & ADENOIDECTOMY      as a child       ALLERGIES  No known drug allergies    FAMILY HX:  Family History   Problem Relation Age of Onset     Hypertension Mother      Other Cancer Mother         Lung () & Kidney ()     Depression Mother      C.A.D. Father          age 64 of  ischemic bowel     Colon Polyps Father         benign     Diabetes Father          at age 64     Hypertension Father         also high cholesterol     Hyperlipidemia Father      Cerebrovascular Disease Father          ischemic strokes, questionable alzheimers, first cva at 58, first MI in his 40s, 2 CABG procedures for MIs     Cancer Paternal Grandmother      Cardiovascular Paternal Uncle         stroke in his 50s     Diabetes Maternal Grandmother          at age 76     Hypertension Maternal Grandmother      Diabetes Maternal Grandfather         diet at age 76     Hypertension Maternal Grandfather      Anxiety Disorder Sister    Dad had MIs in his 40s; however, he was an uncontrolled diabetic.  He  in his 60s after bilateral BKAs and system failure.  Her mom is living.  She has a history of nonsmoker lung cancer and kidney cancer.  She has a sister who is 4 years older and a younger sister who are healthy.  She has one other son.      SOCIAL HX:  Social History     Socioeconomic History     Marital status:      Spouse name: Clinton     Number of children: 2     Years of education: Not on file     Highest education level: Not on file   Occupational History     Occupation: Pharmacist     Employer: [employer]     Comment: [employer]   Tobacco Use     Smoking status: Never Smoker     Smokeless tobacco: Never Used   Substance and Sexual Activity     Alcohol use: Yes     Alcohol/week: 1.0 standard drinks     Types: 1 Standard drinks or equivalent per week     Comment: 1 drink per week     Drug use: No     Sexual activity: Yes     Partners: Male     Birth control/protection: Male Surgical   Other Topics Concern     Parent/sibling w/ CABG, MI or angioplasty before 65F 55M? Yes     Comment: Father   Social History Narrative     Not on file     Social Determinants of Health     Financial Resource Strain:      Difficulty of Paying Living Expenses:    Food Insecurity:      Worried About Running Out of Food in the Last Year:      Ran Out of Food in the Last Year:    Transportation Needs:      Lack of Transportation (Medical):      Lack of Transportation (Non-Medical):    Physical Activity:      Days of Exercise per Week:   "    Minutes of Exercise per Session:    Stress:      Feeling of Stress :    Social Connections:      Frequency of Communication with Friends and Family:      Frequency of Social Gatherings with Friends and Family:      Attends Temple Services:      Active Member of Clubs or Organizations:      Attends Club or Organization Meetings:      Marital Status:    Intimate Partner Violence:      Fear of Current or Ex-Partner:      Emotionally Abused:      Physically Abused:      Sexually Abused:    She is .  No tobacco use.    ROS:  Constitutional: No fever, chills, or sweats. No weight gain/loss.   ENT: No visual disturbance, ear ache, epistaxis, sore throat.   Allergies/Immunologic: Negative.   Respiratory: No cough, hemoptysis.   Cardiovascular: As per HPI.   GI: No nausea, vomiting, hematemesis, melena, or hematochezia.   : No urinary frequency, dysuria, or hematuria.   Integument: Negative.   Psychiatric: Negative.   Neuro: Negative.   Endocrinology: Negative.   Musculoskeletal: No myalgia.    VITAL SIGNS:  /79 (BP Location: Right arm, Patient Position: Chair, Cuff Size: Adult Regular)   Pulse 88   Ht 1.703 m (5' 7.05\")   Wt 80.3 kg (177 lb)   SpO2 99%   BMI 27.68 kg/m    Body mass index is 27.68 kg/m .  Wt Readings from Last 2 Encounters:   11/05/21 80.3 kg (177 lb)   05/05/21 80.2 kg (176 lb 12.9 oz)       PHYSICAL EXAM  Eileen Valladares IS A 51 year old female.in no acute distress.  HEENT: Unremarkable.  Neck: JVP normal.  Carotids +4/4 bilaterally without bruits.  Lungs: CTA.  Cor: RRR. Normal S1 and S2.  No murmur, rub, or gallop.  PMI in Lf 5th ICS..  Extremities: No C/C/E.    Neuro: Grossly intact.    LABS    Lab Results   Component Value Date    WBC 10.0 05/04/2021     Lab Results   Component Value Date    RBC 4.00 05/04/2021     Lab Results   Component Value Date    HGB 11.1 05/05/2021     Lab Results   Component Value Date    HCT 38.2 05/04/2021     No components found for: MCT  Lab " Results   Component Value Date    MCV 96 05/04/2021     Lab Results   Component Value Date    MCH 32.3 05/04/2021     Lab Results   Component Value Date    MCHC 33.8 05/04/2021     Lab Results   Component Value Date    RDW 11.8 05/04/2021     Lab Results   Component Value Date     05/04/2021      Recent Labs   Lab Test 05/04/21 2015 12/02/19  1508    136   POTASSIUM 3.6 4.1   CHLORIDE 104 104   CO2 29 28   ANIONGAP 4 4   GLC 82 76   BUN 15 9   CR 0.73 0.61   DANIELLA 9.1 9.5     Recent Labs   Lab Test 10/31/19  0942 09/17/15  0803   CHOL 208* 202*   HDL 82 74   * 118   TRIG 51 52   CHOLHDLRATIO  --  2.7   NHDL 125  --       Assessment and Plan:   ASSESSMENT AND PLAN:   1.  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  However, this individual had an unaffected father who carried the same variant.   2.  A 20-year-old son also with the variant.    3.  No evidence of aneurysm on MRA of the head, neck, chest, abdomen and pelvis.     4.  A 9 mm indeterminate lesion in the spleen and an 11 mm left hepatic lobe lesion  5.  Anxiety.      DISCUSSION:  It was a pleasure to see Ms. Valladares in Cardiology Clinic today.     She is doing well from CV standpoint with no concerning symptoms. Her echo is stable. Her BP is controlled.  She will work on diet and exercise.    We discussed follow up in 5 years with MRA of chest abdomen and pelvis.    She knows to call us with any questions or concerns before we see her back.  It was certainly a pleasure to see her today.  Please do not hesitate to contact me with any questions or concerns.         MARC Appiah MD [facility]  Adult Congenital and Interventional Cardiology   [facility]  [phone]  46 minutes face-face, documentation and review of records on day of visit

## 2021-11-05 NOTE — NURSING NOTE
Diet: Patient instructed regarding a heart healthy diet, including discussion of reduced fat and sodium intake. Patient demonstrated understanding of this information and agreed to call with further questions or concerns.  Med Reconcile: Reviewed and verified all current medications with the patient. The updated medication list was printed and given to the patient.  Return Appointment: Patient given instructions regarding scheduling next clinic visit. Patient demonstrated understanding of this information and agreed to call with further questions or concerns.  Patient stated she understood all health information given and agreed to call with further questions or concerns.  Stephanie Frederick RN on 11/5/2021 at 2:58 PM

## 2021-11-10 LAB
ATRIAL RATE - MUSE: 88 BPM
DIASTOLIC BLOOD PRESSURE - MUSE: NORMAL MMHG
INTERPRETATION ECG - MUSE: NORMAL
P AXIS - MUSE: 68 DEGREES
PR INTERVAL - MUSE: 166 MS
QRS DURATION - MUSE: 84 MS
QT - MUSE: 358 MS
QTC - MUSE: 433 MS
R AXIS - MUSE: 111 DEGREES
SYSTOLIC BLOOD PRESSURE - MUSE: NORMAL MMHG
T AXIS - MUSE: 64 DEGREES
VENTRICULAR RATE- MUSE: 88 BPM

## 2021-12-26 ENCOUNTER — HEALTH MAINTENANCE LETTER (OUTPATIENT)
Age: 51
End: 2021-12-26

## 2022-02-03 ENCOUNTER — OFFICE VISIT (OUTPATIENT)
Dept: DERMATOLOGY | Facility: CLINIC | Age: 52
End: 2022-02-03
Payer: COMMERCIAL

## 2022-02-03 ENCOUNTER — LAB (OUTPATIENT)
Dept: LAB | Facility: CLINIC | Age: 52
End: 2022-02-03
Attending: DERMATOLOGY
Payer: COMMERCIAL

## 2022-02-03 DIAGNOSIS — L85.3 XEROSIS OF SKIN: ICD-10-CM

## 2022-02-03 DIAGNOSIS — D18.01 CHERRY ANGIOMA: ICD-10-CM

## 2022-02-03 DIAGNOSIS — L57.0 ACTINIC KERATOSIS: ICD-10-CM

## 2022-02-03 DIAGNOSIS — Z51.81 MEDICATION MONITORING ENCOUNTER: ICD-10-CM

## 2022-02-03 DIAGNOSIS — D22.9 MULTIPLE NEVI: ICD-10-CM

## 2022-02-03 DIAGNOSIS — L70.0 ACNE VULGARIS: ICD-10-CM

## 2022-02-03 DIAGNOSIS — Z12.83 SKIN EXAM FOR MALIGNANT NEOPLASM: Primary | ICD-10-CM

## 2022-02-03 DIAGNOSIS — L82.0 SEBORRHEIC KERATOSES, INFLAMED: ICD-10-CM

## 2022-02-03 DIAGNOSIS — B07.0 PLANTAR WART: ICD-10-CM

## 2022-02-03 LAB
ANION GAP SERPL CALCULATED.3IONS-SCNC: 5 MMOL/L (ref 3–14)
BUN SERPL-MCNC: 8 MG/DL (ref 7–30)
CALCIUM SERPL-MCNC: 9 MG/DL (ref 8.5–10.1)
CHLORIDE BLD-SCNC: 104 MMOL/L (ref 94–109)
CO2 SERPL-SCNC: 28 MMOL/L (ref 20–32)
CREAT SERPL-MCNC: 0.66 MG/DL (ref 0.52–1.04)
GFR SERPL CREATININE-BSD FRML MDRD: >90 ML/MIN/1.73M2
GLUCOSE BLD-MCNC: 87 MG/DL (ref 70–99)
POTASSIUM BLD-SCNC: 3.9 MMOL/L (ref 3.4–5.3)
SODIUM SERPL-SCNC: 137 MMOL/L (ref 133–144)

## 2022-02-03 PROCEDURE — 99214 OFFICE O/P EST MOD 30 MIN: CPT | Mod: 25 | Performed by: STUDENT IN AN ORGANIZED HEALTH CARE EDUCATION/TRAINING PROGRAM

## 2022-02-03 PROCEDURE — 36415 COLL VENOUS BLD VENIPUNCTURE: CPT | Performed by: PATHOLOGY

## 2022-02-03 PROCEDURE — 17110 DESTRUCTION B9 LES UP TO 14: CPT | Mod: GC | Performed by: STUDENT IN AN ORGANIZED HEALTH CARE EDUCATION/TRAINING PROGRAM

## 2022-02-03 PROCEDURE — 17000 DESTRUCT PREMALG LESION: CPT | Mod: GC | Performed by: STUDENT IN AN ORGANIZED HEALTH CARE EDUCATION/TRAINING PROGRAM

## 2022-02-03 PROCEDURE — 80048 BASIC METABOLIC PNL TOTAL CA: CPT | Performed by: PATHOLOGY

## 2022-02-03 RX ORDER — TRETINOIN 0.25 MG/G
CREAM TOPICAL
Qty: 20 G | Refills: 11 | Status: SHIPPED | OUTPATIENT
Start: 2022-02-03 | End: 2023-09-07

## 2022-02-03 RX ORDER — UREA 200 MG/G
CREAM TOPICAL AT BEDTIME
Qty: 480 G | Refills: 3 | Status: SHIPPED | OUTPATIENT
Start: 2022-02-03 | End: 2023-09-07

## 2022-02-03 RX ORDER — SPIRONOLACTONE 100 MG/1
100 TABLET, FILM COATED ORAL DAILY
Qty: 90 TABLET | Refills: 4 | Status: SHIPPED | OUTPATIENT
Start: 2022-02-03 | End: 2023-05-22

## 2022-02-03 ASSESSMENT — PAIN SCALES - GENERAL: PAINLEVEL: NO PAIN (0)

## 2022-02-03 NOTE — PROGRESS NOTES
[facility] Dermatology Note   Encounter Date: Feb 3, 2022  Office visit    Dermatology Problem List:    Last FBSE: 2/3/22    # Acne vulgaris  - s/p ILK5 1/24/19  - Current Tx: spironolactone 100mg daily and tretinoin 0.025% cream 2-3x/wk  - Previous Tx: BPO 5% (too irritating)  # AK  - s/p LN2  # Verruca, L plantar foot  - s/p LN2    ___________________________________________    Assessment & Plan:    # Acne vulgaris  Condition has improved with current regimen. Will obtain yearly BMP today. Will also add on tretinoin 0.025% cream for improved control.   - Ordered BMP for safety monitoring  - Continue with spironolactone 100mg daily   - Refilled today  - Prescribed tretinoin 0.025% cream 2-3x/wk    # AK - central forehead (x1)  # ISK - R temple (x1)  # Verruca - L plantar foot (x1)  Discussed the possible etiologies, clinical course, and management options of this condition with the patient. Agreeable to cryotherapy to these spots  - See procedure note below    Procedures Performed:  Cryotherapy procedure note - AK  - location: central forehead  - number of lesions treated: 1  After verbal consent and discussion of risks and benefits including but no limited to dyspigmentation/scar, blister, and pain, lesions treated with 1-2mm freeze border for 2 cycles with liquid nitrogen, post cryotherapy instructions provided    Cryotherapy procedure note - ISK  - location: R temple  - number of lesions treated: 1  After verbal consent and discussion of risks and benefits including but no limited to dyspigmentation/scar, blister, and pain, lesions treated with 1-2mm freeze border for 2 cycles with liquid nitrogen, post cryotherapy instructions provided    Cryotherapy procedure note - Verruca  - location: L plantar foot  - number of lesions treated: 1  After verbal consent and discussion of risks and benefits including but no limited to dyspigmentation/scar, blister, and pain, lesions treated with 1-2mm  freeze border for 2 cycles with liquid nitrogen, post cryotherapy instructions provided    Follow-up: 1 year or earlier if needed    Staff Involved:  Patient was seen and staffed with attending physician Dr. Sariah Lozano MD  Med/Derm Resident PGY-4  P:[phone]    Staff Addendum:  Patient was seen and examined with the medicine/dermatology resident. I agree with the history, review of systems, physical examination, assessments and plan. I was present for the key portions of the cryotherapy procedures.    Geeta Rolle MD  Professor and  Chair  Department of Dermatology  [facility]  ___________________________________________      CC: Derm Problem (Eileen is here for a full body skin check, one area of concern under left foot. Refill acne meds, sees improvement)    HPI:  Ms. Eileen Valladares is a(n) 51 year old female who presents to clinic today for FBSE and acne follow up  - reports that her acne is doing well on current regimen  - no issues thus far  - has a new spot on her left plantar foot  - wants to make sure it's not a melanoma  - otherwise feeling well in usual state of health    Physical exam:  General: in no acute distress, well-developed, well-nourished  Skin:  - skin type: fair  - scattered minimal open comedones on the perioral skin  - erythematous macule with rough, gritty scale on central forehead  - multiple light brown to tan papules with reassuring pigment network on dermoscopy on shoulders, face, arms, and chest  - light brown macules on sun exposed skin  - thin light brown waxy stuck on plaque on the R temple with an erythematous hue  - verrucous papule on the L plantar foot that disrupts the dermatoglyphs  - No other lesions of concern on areas examined.     Medications:  Current Outpatient Medications   Medication     buPROPion (WELLBUTRIN XL) 300 MG 24 hr tablet     CALCIUM + D 600-200 MG-UNIT OR TABS     Cholecalciferol (VITAMIN D) 2000 UNITS tablet      multivitamin w/minerals (THERA-VIT-M) tablet     spironolactone (ALDACTONE) 100 MG tablet     No current facility-administered medications for this visit.      Past Medical History:   Patient Active Problem List   Diagnosis     GRANULOMA LUNG     CYST KIDNEY     Generalized anxiety disorder     Hyperlipidemia with target LDL less than 130     Temporomandibular joint disorder     Obesity, Class I, BMI 30-34.9     Moderate episode of recurrent major depressive disorder (H)     Vaginal bleeding     Status post hysteroscopic myomectomy     Loeys-Becca syndrome     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    TGFRR2 variant positive, no signs or symptoms of the syndrome     Temporomandibular joint disorders, unspecified      Uterine fibroid 2015       CC No referring provider defined for this encounter. on close of this encounter.

## 2022-02-03 NOTE — NURSING NOTE
Dermatology Rooming Note    Eileen Valladares's goals for this visit include:   Chief Complaint   Patient presents with     Derm Problem     Eileen is here for a full body skin check, one area of concern under left foot. Refill acne meds, sees improvement     Britney Rider, EMT

## 2022-02-03 NOTE — LETTER
2/3/2022       RE: Eileen Valladares  [address]  [place] [address]     Dear Colleague,    Thank you for referring your patient, Eileen Valladares, to the [facility] DERMATOLOGY CLINIC [place] at [facility]. Please see a copy of my visit note below.    [facility] Dermatology Note   Encounter Date: Feb 3, 2022  Office visit    Dermatology Problem List:    Last FBSE: 2/3/22    # Acne vulgaris  - s/p ILK5 1/24/19  - Current Tx: spironolactone 100mg daily and tretinoin 0.025% cream 2-3x/wk  - Previous Tx: BPO 5% (too irritating)  # AK  - s/p LN2  # Verruca, L plantar foot  - s/p LN2    ___________________________________________    Assessment & Plan:    # Acne vulgaris  Condition has improved with current regimen. Will obtain yearly BMP today. Will also add on tretinoin 0.025% cream for improved control.   - Ordered BMP for safety monitoring  - Continue with spironolactone 100mg daily   - Refilled today  - Prescribed tretinoin 0.025% cream 2-3x/wk    # AK - central forehead (x1)  # ISK - R temple (x1)  # Verruca - L plantar foot (x1)  Discussed the possible etiologies, clinical course, and management options of this condition with the patient. Agreeable to cryotherapy to these spots  - See procedure note below    Procedures Performed:  Cryotherapy procedure note - AK  - location: central forehead  - number of lesions treated: 1  After verbal consent and discussion of risks and benefits including but no limited to dyspigmentation/scar, blister, and pain, lesions treated with 1-2mm freeze border for 2 cycles with liquid nitrogen, post cryotherapy instructions provided    Cryotherapy procedure note - ISK  - location: R temple  - number of lesions treated: 1  After verbal consent and discussion of risks and benefits including but no limited to dyspigmentation/scar, blister, and pain, lesions treated with 1-2mm freeze border for 2 cycles with liquid  nitrogen, post cryotherapy instructions provided    Cryotherapy procedure note - Verruca  - location: L plantar foot  - number of lesions treated: 1  After verbal consent and discussion of risks and benefits including but no limited to dyspigmentation/scar, blister, and pain, lesions treated with 1-2mm freeze border for 2 cycles with liquid nitrogen, post cryotherapy instructions provided    Follow-up: 1 year or earlier if needed    Staff Involved:  Patient was seen and staffed with attending physician Dr. Sariah Lozano MD  Med/Derm Resident PGY-4  P:[phone]    Staff Addendum:  Patient was seen and examined with the medicine/dermatology resident. I agree with the history, review of systems, physical examination, assessments and plan. I was present for the key portions of the cryotherapy procedures.    Geeta Rolle MD  Professor and  Chair  Department of Dermatology  [facility]  ___________________________________________      CC: Derm Problem (Eileen is here for a full body skin check, one area of concern under left foot. Refill acne meds, sees improvement)    HPI:  Ms. Eileen Valladares is a(n) 51 year old female who presents to clinic today for FBSE and acne follow up  - reports that her acne is doing well on current regimen  - no issues thus far  - has a new spot on her left plantar foot  - wants to make sure it's not a melanoma  - otherwise feeling well in usual state of health    Physical exam:  General: in no acute distress, well-developed, well-nourished  Skin:  - skin type: fair  - scattered minimal open comedones on the perioral skin  - erythematous macule with rough, gritty scale on central forehead  - multiple light brown to tan papules with reassuring pigment network on dermoscopy on shoulders, face, arms, and chest  - light brown macules on sun exposed skin  - thin light brown waxy stuck on plaque on the R temple with an erythematous hue  - verrucous papule on the  L plantar foot that disrupts the dermatoglyphs  - No other lesions of concern on areas examined.     Medications:  Current Outpatient Medications   Medication     buPROPion (WELLBUTRIN XL) 300 MG 24 hr tablet     CALCIUM + D 600-200 MG-UNIT OR TABS     Cholecalciferol (VITAMIN D) 2000 UNITS tablet     multivitamin w/minerals (THERA-VIT-M) tablet     spironolactone (ALDACTONE) 100 MG tablet     No current facility-administered medications for this visit.      Past Medical History:   Patient Active Problem List   Diagnosis     GRANULOMA LUNG     CYST KIDNEY     Generalized anxiety disorder     Hyperlipidemia with target LDL less than 130     Temporomandibular joint disorder     Obesity, Class I, BMI 30-34.9     Moderate episode of recurrent major depressive disorder (H)     Vaginal bleeding     Status post hysteroscopic myomectomy     Loeys-Becca syndrome     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    TGFRR2 variant positive, no signs or symptoms of the syndrome     Temporomandibular joint disorders, unspecified      Uterine fibroid 2015       CC No referring provider defined for this encounter. on close of this encounter.

## 2022-02-03 NOTE — RESULT ENCOUNTER NOTE
Reviewed results showing normal BMP. Discussed these results with patient over [id number] on 2/3/22. Ok to continue with spironolactone    CC'ing Dr. Rolle as FYI only

## 2022-02-03 NOTE — PATIENT INSTRUCTIONS
Cryotherapy    What is it?    Use of a very cold liquid, such as liquid nitrogen, to freeze and destroy abnormal skin cells that need to be removed    What should I expect?    Tenderness and redness    A small blister that might grow and fill with dark purple blood. There may be crusting.    More than one treatment may be needed if the lesions do not go away.    How do I care for the treated area?    Gently wash the area with your hands when bathing.    Use a thin layer of Vaseline to help with healing. You may use a Band-Aid.     The area should heal within 7-10 days and may leave behind a pink or lighter color.     Do not use an antibiotic or Neosporin ointment.     You may take acetaminophen (Tylenol) for pain.     Call your doctor if you have:    Severe pain    Signs of infection (warmth, redness, cloudy yellow drainage, and or a bad smell)    Questions or concerns    Who should I call with questions?       [facility]: [phone]       [facility]: [phone]       For urgent needs outside of business hours call the [facility] at [phone] and ask for the dermatology resident on call    Gentle Skin Care    1. Bathing tips    Daily bathing is recommended. Baths are better than showers as soaking can help hydrate the skin.    Avoid super hot water. Although it feels good with the cold weather, hot water can dry out the skin.    Use soap sparingly. Believe it or not, we only really need soap to wash the armpits and groin, or other areas that are visibly dirty.     Use gentle soap products such as Dove bar soap for sensitive skin. Vanicream, CeraVe and Cetaphil are also great brands for sensitive skin.    Do not vigorously scrub your skin during cleansing.     After bathing, pat your skin lightly with a towel. Do not rub or scrub when drying.     2. Moisturizing tips    Always apply a moisturizer immediately to the entire body after  "bathing. This helps to \"lock in\" the moisture. Moisturizer can be applied daily if necessary.     Moisturizers containing ceramides are better. Ceramides are natural lipids that our skin needs to stay healthy. We recommend Vanicream, CeraVe, Cetaphil Restoraderm, Eucerin Professional Repair or Curel Intensive Healing Cream.    Adding in a humidifier at night can be soothing.     3. Lifestyle tips    Avoid products such as powders, perfumes or colognes on your skin. These can cause further irritation in sensitive skin.    Avoid saunas and steam baths. The temperature is too hot.     Use unscented hypo-allergenic laundry products.    Avoid tight or \"scratchy\" clothing such as wool, as they can cause irritation and itching.    Below is a list of products our providers recommend for gentle skin care.  Moisturizers:    Lighter; Cetaphil Cream, CeraVe, Aveeno and Vanicream Light     Thicker; Aquaphor Ointment, Vaseline, Petrolium Jelly, Eucerin and Vanicream    Avoid Lotions (too thin)  Mild Cleansers:    Dove- Fragrance Free    CeraVe     Vanicream Cleansing Bar    Cetaphil Cleanser     Aquaphor 2 in1 Gentle Wash and Shampoo       Laundry Products:    All Free and Clear    Cheer Free    Generic Brands are okay as long as they are  Fragrance Free      Avoid fabric softeners  and dryer sheets   Sunscreens: SPF 30 or greater     Sunscreens that contain Zinc Oxide or Titanium Dioxide should be applied, these are physical blockers. Spray or  chemical  sunscreens should be avoided.        Shampoo and Conditioners:    Free and Clear by Vanicream    Aquaphor 2 in 1 Gentle Wash and Shampoo    California Baby  super sensitive   Oils:    Mineral Oil     Emu Oil     For some patients, coconut and sunflower seed oil        "

## 2022-02-10 ENCOUNTER — HOSPITAL ENCOUNTER (OUTPATIENT)
Dept: MAMMOGRAPHY | Facility: CLINIC | Age: 52
Discharge: HOME | End: 2022-02-10
Attending: OBSTETRICS & GYNECOLOGY | Admitting: OBSTETRICS & GYNECOLOGY
Payer: COMMERCIAL

## 2022-02-10 DIAGNOSIS — R92.30 DENSE BREAST TISSUE: ICD-10-CM

## 2022-02-10 PROCEDURE — 77062 BREAST TOMOSYNTHESIS BI: CPT

## 2022-08-20 ENCOUNTER — PATIENT OUTREACH (OUTPATIENT)
Dept: DERMATOLOGY | Facility: CLINIC | Age: 52
End: 2022-08-20

## 2022-08-20 NOTE — TELEPHONE ENCOUNTER
Attempted to reach patient to schedule follow up in the Dermatology Clinic.  No answer,  LM on VM to call office and [employer] message sent..    Schedule with Dr. Melquiades Lozano -2/2023.

## 2022-10-23 ENCOUNTER — HEALTH MAINTENANCE LETTER (OUTPATIENT)
Age: 52
End: 2022-10-23

## 2023-03-20 ENCOUNTER — HOSPITAL ENCOUNTER (OUTPATIENT)
Dept: MAMMOGRAPHY | Facility: CLINIC | Age: 53
Discharge: HOME | End: 2023-03-20
Attending: OBSTETRICS & GYNECOLOGY
Payer: COMMERCIAL

## 2023-03-20 DIAGNOSIS — R92.30 DENSE BREAST TISSUE: ICD-10-CM

## 2023-03-20 PROCEDURE — 77062 BREAST TOMOSYNTHESIS BI: CPT

## 2023-03-20 PROCEDURE — 76642 ULTRASOUND BREAST LIMITED: CPT | Mod: RT

## 2023-04-02 ENCOUNTER — HEALTH MAINTENANCE LETTER (OUTPATIENT)
Age: 53
End: 2023-04-02

## 2023-05-04 DIAGNOSIS — L70.0 ACNE VULGARIS: ICD-10-CM

## 2023-05-04 NOTE — TELEPHONE ENCOUNTER
[facility] Call Center    Phone Message    May a detailed message be left on voicemail: yes     Reason for Call: Medication Refill Request    Has the patient contacted the pharmacy for the refill? No - Direct patient to contact their pharmacy.  The pharmacy will send the requests to us on their behalf.      Action Taken: Message routed to:  [facility] Pt is requesting for refill for spironolactone (ALDACTONE) 100 MG tablet. Pt Set up appointment for 09/07/23, could we schedule sooner so we can request refill. Pls discuss with Pt thank  you    Travel Screening: Not Applicable

## 2023-05-05 DIAGNOSIS — Z51.81 MEDICATION MONITORING ENCOUNTER: Primary | ICD-10-CM

## 2023-05-05 NOTE — TELEPHONE ENCOUNTER
spironolactone (ALDACTONE) 100 MG tablet   Last Written Prescription Date:  2/3/22  Last Fill Quantity: 90,   # refills: 4  Last Office Visit : 2/3/22 ( [facility])  Future Office visit:  9/7/23    Routing refill request to provider for review/approval because:  Last seen 2/3/22, made appt 1st available 9/7/23  BMP done 2/3/22

## 2023-05-05 NOTE — PROGRESS NOTES
Received refill request for spironolactone 100mg daily. Reviewed patient's chart and attached communication. Patient last seen 2/2022 for FBSE. RTC scheduled for 9/2023. Needs BMP for safety lab before continuing medication.     Placed standing BMP and sent [employer] message to patient about lab needed prior to prescription refill    CC'ing Dr. Culp as JONNY Lozano MD  Med/Derm PGY-5  P: [phone]

## 2023-05-12 RX ORDER — SPIRONOLACTONE 100 MG/1
100 TABLET, FILM COATED ORAL DAILY
Qty: 90 TABLET | Refills: 1 | OUTPATIENT
Start: 2023-05-12

## 2023-05-12 NOTE — TELEPHONE ENCOUNTER
Received refill request for spironolactone. Reviewed patient's chart and attached communication. Patient last seen 2/2022 for skin exam and acne. RTC scheduled for 9/2023. No BMP since 2/2022.     After reviewing the medication list and assessment and plan from last visit, the refill request was denied. Needs lab. BMP placed 5/5/23 as standing order with message sent to patient.     CC'ing Dr. Culp as JONNY Lozano MD  Med/Derm PGY-4  P: [phone]

## 2023-05-22 DIAGNOSIS — L70.0 ACNE VULGARIS: ICD-10-CM

## 2023-05-22 LAB
ANION GAP SERPL CALCULATED.3IONS-SCNC: 14 MMOL/L (ref 7–15)
BUN SERPL-MCNC: 15.3 MG/DL (ref 6–20)
CALCIUM SERPL-MCNC: 10 MG/DL (ref 8.6–10)
CHLORIDE SERPL-SCNC: 102 MMOL/L (ref 98–107)
CREAT SERPL-MCNC: 0.72 MG/DL (ref 0.51–0.95)
DEPRECATED HCO3 PLAS-SCNC: 23 MMOL/L (ref 22–29)
GFR SERPL CREATININE-BSD FRML MDRD: >90 ML/MIN/1.73M2
GLUCOSE SERPL-MCNC: 89 MG/DL (ref 70–99)
POTASSIUM SERPL-SCNC: 4.2 MMOL/L (ref 3.4–5.3)
SODIUM SERPL-SCNC: 139 MMOL/L (ref 136–145)

## 2023-05-22 PROCEDURE — 82310 ASSAY OF CALCIUM: CPT | Performed by: DERMATOLOGY

## 2023-05-22 PROCEDURE — 36415 COLL VENOUS BLD VENIPUNCTURE: CPT | Performed by: PATHOLOGY

## 2023-05-22 RX ORDER — SPIRONOLACTONE 100 MG/1
100 TABLET, FILM COATED ORAL DAILY
Qty: 150 TABLET | Refills: 0 | Status: SHIPPED | OUTPATIENT
Start: 2023-05-22 | End: 2023-09-07

## 2023-05-23 NOTE — RESULT ENCOUNTER NOTE
Reviewed results showing normal BMP. [facility]. Will plan to refill spironolactone until follow up (scheduled for 9/7/23)    Follow up on 9/7/23    CC'ing Dr. Culp as FYI only

## 2023-05-23 NOTE — PROGRESS NOTES
Received refill request for spironolactone 100mg daily. Reviewed patient's chart and attached communication. Patient last seen 2/2022 for FBSE. RTC scheduled for 9/2023. Most recent BMP (5/22/23) within normal limits. Will provide enough medication to last until 9/7/23 appt    After reviewing the medication list and assessment and plan from last visit, the refill request was accepted. Updated DPL below    # Acne vulgaris  - s/p ILK5 1/24/19  - Current Tx: spironolactone 100mg daily and tretinoin 0.025% cream 2-3x/wk   Last BMP: wnl (5/22/23)  - Previous Tx: BPO 5% (too irritating)  # AK  - s/p LN2  # Verwinsomeca, L plantar foot  - s/p LN2      Melquiades Lozano MD  Med/Derm PGY-5  P: [phone]

## 2023-09-07 ENCOUNTER — OFFICE VISIT (OUTPATIENT)
Dept: DERMATOLOGY | Facility: CLINIC | Age: 53
End: 2023-09-07
Payer: COMMERCIAL

## 2023-09-07 DIAGNOSIS — L85.3 XEROSIS OF SKIN: ICD-10-CM

## 2023-09-07 DIAGNOSIS — L84 CALLUS: Primary | ICD-10-CM

## 2023-09-07 DIAGNOSIS — L82.0 INFLAMED SEBORRHEIC KERATOSIS: ICD-10-CM

## 2023-09-07 DIAGNOSIS — L70.0 ACNE VULGARIS: ICD-10-CM

## 2023-09-07 PROCEDURE — 17110 DESTRUCTION B9 LES UP TO 14: CPT | Performed by: STUDENT IN AN ORGANIZED HEALTH CARE EDUCATION/TRAINING PROGRAM

## 2023-09-07 PROCEDURE — 99214 OFFICE O/P EST MOD 30 MIN: CPT | Mod: 25 | Performed by: STUDENT IN AN ORGANIZED HEALTH CARE EDUCATION/TRAINING PROGRAM

## 2023-09-07 RX ORDER — UREA 200 MG/G
CREAM TOPICAL AT BEDTIME
Qty: 480 G | Refills: 3 | Status: SHIPPED | OUTPATIENT
Start: 2023-09-07

## 2023-09-07 RX ORDER — SPIRONOLACTONE 100 MG/1
100 TABLET, FILM COATED ORAL DAILY
Qty: 150 TABLET | Refills: 0 | Status: SHIPPED | OUTPATIENT
Start: 2023-09-07 | End: 2023-12-19

## 2023-09-07 RX ORDER — TRETINOIN 0.25 MG/G
CREAM TOPICAL
Qty: 20 G | Refills: 11 | Status: SHIPPED | OUTPATIENT
Start: 2023-09-07

## 2023-09-07 ASSESSMENT — PAIN SCALES - GENERAL: PAINLEVEL: NO PAIN (0)

## 2023-09-07 NOTE — LETTER
9/7/2023       RE: Eileen Valladares  [address]  [place] [address]     Dear Colleague,    Thank you for referring your patient, Eileen Valladares, to the [facility] [place] at [facility]. Please see a copy of my visit note below.    [facility] Dermatology Note   Encounter Date: Sep 7, 2023  Office visit      Dermatology Problem List:    Last FBSE: 9/7/2023    # Acne vulgaris, stable  - s/p ILK5 1/24/19  - Current Tx: spironolactone 100mg daily and tretinoin 0.025% cream 2-3x/wk  - Previous Tx: BPO 5% (too irritating)  # Pedal Callus  - Current tx: urea 20% external cream  # SK  - s/p cryo 2/3/2022, 9/7/2023  # AK  - s/p cryo 2/3/2022  # Verruca, L plantar foot  - s/p cryo 2/3/2022    ___________________________________________    Assessment & Plan:    # Acne vulgaris, stable  - Continue tretinoin 0.025% cream in PM, 2-3x/wk  - Continue with spironolactone 100mg daily  - Medications refilled today    # Pedal Callus, improved  - Continue urea 20% external cream  - Medication refilled today    # SK - R temple (x1), cryo 2/3/2022,   - repeat cryo 9/7/2923\  see procedure note        Procedures Performed:  Cryotherapy procedure note - SK  - location: R temple  - number of lesions treated: 1  After verbal consent and discussion of risks and benefits including but no limited to dyspigmentation/scar, blister, and pain, lesions treated with 1-2mm freeze border for 2 cycles with liquid nitrogen, post cryotherapy instructions provided    Follow-up: 1 year or earlier if needed    Staff Involved:  Yuliya Clark, MS3  [employer]    ___________________________________________      CC: Skin Check (FBSE- Eileen reports no spots of concern. )    HPI:  Ms. Eileen Valladares is a(n) 53 year old female who presents to clinic today for FBSE and acne follow up. She reports that her acne is doing well on current regimen:  spironolactone 100mg daily and tretinoin 0.025% cream 2-3x/wk. She explains that she has been inconsistent with using the tretinoin because she forgets, but otherwise, it has been working well for her. In addition to these medications, her skin care regimen also includes using makeup remover wipes, Cera Ve or Cetaphil cream, and a sunscreen in the AM with an SPF of 45.    Additionally, she notes that the urea 20% external cream has worked well in tx of her foot callus.    She would like the tretinoin, spironolactone, and urea cream refilled.    Lastly, she notes that the SK on her R temple, which was treated with cryotherapy during her last derm appointment (2/3/2022), has persisted and she is interested in it being treated with cryotherapy again.    Patient is otherwise feeling well, without additional skin concerns.      Physical exam:  General: in no acute distress, well-developed, well-nourished  Skin:  - skin type: fair  - multiple light brown to tan papules on shoulders, face, arms, and chest, sparsely on legs, no atypia noted  - multiple red dome shaped papules on trunk and upper extremities  - light brown macules on sun exposed skin  - Mild xerosis of bilateral plantar surface of feet  - thin light brown waxy stuck on plaque on the R temple  - No other lesions of concern on areas examined.     Medications:  Current Outpatient Medications   Medication    buPROPion (WELLBUTRIN XL) 300 MG 24 hr tablet    CALCIUM + D 600-200 MG-UNIT OR TABS    Cholecalciferol (VITAMIN D) 2000 UNITS tablet    multivitamin w/minerals (THERA-VIT-M) tablet    spironolactone (ALDACTONE) 100 MG tablet    tretinoin (RETIN-A) 0.025 % external cream    urea (GORMEL) 20 % external cream     No current facility-administered medications for this visit.      Past Medical History:   Patient Active Problem List   Diagnosis    GRANULOMA LUNG    CYST KIDNEY    Generalized anxiety disorder    Hyperlipidemia with target LDL less than 130     Temporomandibular joint disorder    Obesity, Class I, BMI 30-34.9    Moderate episode of recurrent major depressive disorder (H)    Vaginal bleeding    Status post hysteroscopic myomectomy    Loeys-Becca syndrome     Past Medical History:   Diagnosis Date    Anxiety, generalized 2001    controlled with sertraline    Depressive disorder 11/1/2016    Going through divorce    Family history of other specified malignant neoplasm     Mom with primary lung CA and primary renal cell CA    Family history of other specified malignant neoplasm     Female infertility of unspecified origin     Clomid to conceive 2 sons    Fibrocystic Breast Disease     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 on close of this encounter.    Attestation with edits by Danny Barrow MD at 9/8/2023  8:39 AM:  I have personally examined this patient and agree with the medical student's documentation and plan of care. I have reviewed and amended the medical student's note as necessary. I personally performed all procedure(s).The documentation accurately reflects my clinical observations, diagnoses, treatment and follow-up plans.     Danny Barrow M.D.   Dermatology Staff

## 2023-09-07 NOTE — PROGRESS NOTES
[facility] Dermatology Note   Encounter Date: Sep 7, 2023  Office visit      Dermatology Problem List:    Last FBSE: 9/7/2023    # Acne vulgaris, stable  - s/p ILK5 1/24/19  - Current Tx: spironolactone 100mg daily and tretinoin 0.025% cream 2-3x/wk  - Previous Tx: BPO 5% (too irritating)  # Pedal Callus  - Current tx: urea 20% external cream  # SK  - s/p cryo 2/3/2022, 9/7/2023  # AK  - s/p cryo 2/3/2022  # Verruca, L plantar foot  - s/p cryo 2/3/2022    ___________________________________________    Assessment & Plan:    # Acne vulgaris, stable  - Continue tretinoin 0.025% cream in PM, 2-3x/wk  - Continue with spironolactone 100mg daily  - Medications refilled today    # Pedal Callus, improved  - Continue urea 20% external cream  - Medication refilled today    # SK - R temple (x1), cryo 2/3/2022,   - repeat cryo 9/7/2923\  - see procedure note        Procedures Performed:  Cryotherapy procedure note - SK  - location: R temple  - number of lesions treated: 1  After verbal consent and discussion of risks and benefits including but no limited to dyspigmentation/scar, blister, and pain, lesions treated with 1-2mm freeze border for 2 cycles with liquid nitrogen, post cryotherapy instructions provided    Follow-up: 1 year or earlier if needed    Staff Involved:  Yuliya Clark, MS3  [facility] [employer]    ___________________________________________      CC: Skin Check (FBSE- Eileen reports no spots of concern. )    HPI:  Ms. Eileen Valladares is a(n) 53 year old female who presents to clinic today for FBSE and acne follow up. She reports that her acne is doing well on current regimen: spironolactone 100mg daily and tretinoin 0.025% cream 2-3x/wk. She explains that she has been inconsistent with using the tretinoin because she forgets, but otherwise, it has been working well for her. In addition to these medications, her skin care regimen also includes using makeup remover wipes, Cera  Ve or Cetaphil cream, and a sunscreen in the AM with an SPF of 45.    Additionally, she notes that the urea 20% external cream has worked well in tx of her foot callus.    She would like the tretinoin, spironolactone, and urea cream refilled.    Lastly, she notes that the SK on her R temple, which was treated with cryotherapy during her last derm appointment (2/3/2022), has persisted and she is interested in it being treated with cryotherapy again.    Patient is otherwise feeling well, without additional skin concerns.      Physical exam:  General: in no acute distress, well-developed, well-nourished  Skin:  - skin type: fair  - multiple light brown to tan papules on shoulders, face, arms, and chest, sparsely on legs, no atypia noted  - multiple red dome shaped papules on trunk and upper extremities  - light brown macules on sun exposed skin  - Mild xerosis of bilateral plantar surface of feet  - thin light brown waxy stuck on plaque on the R temple  - No other lesions of concern on areas examined.     Medications:  Current Outpatient Medications   Medication    buPROPion (WELLBUTRIN XL) 300 MG 24 hr tablet    CALCIUM + D 600-200 MG-UNIT OR TABS    Cholecalciferol (VITAMIN D) 2000 UNITS tablet    multivitamin w/minerals (THERA-VIT-M) tablet    spironolactone (ALDACTONE) 100 MG tablet    tretinoin (RETIN-A) 0.025 % external cream    urea (GORMEL) 20 % external cream     No current facility-administered medications for this visit.      Past Medical History:   Patient Active Problem List   Diagnosis    GRANULOMA LUNG    CYST KIDNEY    Generalized anxiety disorder    Hyperlipidemia with target LDL less than 130    Temporomandibular joint disorder    Obesity, Class I, BMI 30-34.9    Moderate episode of recurrent major depressive disorder (H)    Vaginal bleeding    Status post hysteroscopic myomectomy    Loeys-Becca syndrome     Past Medical History:   Diagnosis Date    Anxiety, generalized 2001    controlled with  sertraline    Depressive disorder 11/1/2016    Going through divorce    Family history of other specified malignant neoplasm     Mom with primary lung CA and primary renal cell CA    Family history of other specified malignant neoplasm     Female infertility of unspecified origin     Clomid to conceive 2 sons    Fibrocystic Breast Disease     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 on close of this encounter.

## 2023-09-07 NOTE — NURSING NOTE
Dermatology Rooming Note    Eileen Valladares's goals for this visit include:   Chief Complaint   Patient presents with    Skin Check     FBSE- Eileen reports no spots of concern.      Anna Ray, EMT

## 2023-12-18 DIAGNOSIS — L70.0 ACNE VULGARIS: ICD-10-CM

## 2023-12-19 RX ORDER — SPIRONOLACTONE 100 MG/1
TABLET, FILM COATED ORAL
Qty: 150 TABLET | Refills: 0 | Status: SHIPPED | OUTPATIENT
Start: 2023-12-19 | End: 2024-03-12

## 2023-12-27 ENCOUNTER — TELEPHONE (OUTPATIENT)
Dept: CONSULT | Facility: CLINIC | Age: 53
End: 2023-12-27
Payer: COMMERCIAL

## 2023-12-27 NOTE — TELEPHONE ENCOUNTER
Called Eileen to review updated genetic test report. RENNY requesting call back    Yesi Feliz [facility]  Genetic Counselor   [facility]   Phone: [phone]

## 2023-12-28 NOTE — TELEPHONE ENCOUNTER
"Called Eileen to update her about her son, Mckay's genetic test results, with his permission. We received an updated genetic test report for Mckay recently issued by the [facility]. He was seen by Dr. Barry in 2015 due to mildly dilated aortic root, redundant leaflets of the mitral valve and trivial mitral insufficiency, pectus excavatum requiring repair, and hyperflexible fingers.     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 The lab recently reclassified the TGFBR2 variant as \"likely benign\", meaning it is likely normal human variation that does not cause Loeys-Becca syndrome. Further evaluation is available to Mckay to better understand the cause of his aortic root dilation, pectus, and hypermobility. This would likely involve an updated genetic test to reanalyze the same genes with updated sequencing technology and evaluate other genes that Mckay has not been tested for yet. This is subject to the 's evaluation and may change after their exam/history. He is interested in this, so we will reach out to set up a visit with a  and GC. Further genetic testing for Eileen and her relatives is not recommended at this time. If a genetic diagnosis is established for Mckay, testing for Eileen, Mckay's father, and other relatives would be available.     Plan  Messaged  to reach out to Mckay to coordinate a new MD  appointment   If there is a diagnosis for Mckay, targeted testing for Eileen and other relatives can be completed  No additional questions or concerns. Contact information provided     Yesi Feliz [facility]  Genetic Counselor   [facility]   Phone: [phone]  "

## 2024-03-08 DIAGNOSIS — L70.0 ACNE VULGARIS: ICD-10-CM

## 2024-03-12 RX ORDER — SPIRONOLACTONE 100 MG/1
100 TABLET, FILM COATED ORAL EVERY MORNING
Qty: 150 TABLET | Refills: 0 | Status: SHIPPED | OUTPATIENT
Start: 2024-03-12 | End: 2024-06-12

## 2024-05-28 ENCOUNTER — HOSPITAL ENCOUNTER (OUTPATIENT)
Dept: MAMMOGRAPHY | Facility: CLINIC | Age: 54
Discharge: HOME | End: 2024-05-28
Attending: OBSTETRICS & GYNECOLOGY
Payer: COMMERCIAL

## 2024-05-28 DIAGNOSIS — R92.30 DENSE BREAST TISSUE: ICD-10-CM

## 2024-05-28 PROCEDURE — 77062 BREAST TOMOSYNTHESIS BI: CPT

## 2024-06-08 ENCOUNTER — HEALTH MAINTENANCE LETTER (OUTPATIENT)
Age: 54
End: 2024-06-08

## 2024-06-10 DIAGNOSIS — L70.0 ACNE VULGARIS: ICD-10-CM

## 2024-06-12 RX ORDER — SPIRONOLACTONE 100 MG/1
100 TABLET, FILM COATED ORAL EVERY MORNING
Qty: 150 TABLET | Refills: 0 | Status: SHIPPED | OUTPATIENT
Start: 2024-06-12

## 2024-08-31 DIAGNOSIS — L70.0 ACNE VULGARIS: ICD-10-CM

## 2024-09-04 RX ORDER — SPIRONOLACTONE 100 MG/1
100 TABLET, FILM COATED ORAL EVERY MORNING
Qty: 150 TABLET | Refills: 0 | OUTPATIENT
Start: 2024-09-04

## 2024-11-22 DIAGNOSIS — L70.0 ACNE VULGARIS: ICD-10-CM

## 2024-11-26 RX ORDER — TRETINOIN 0.25 MG/G
CREAM TOPICAL
Qty: 20 G | Refills: 11 | Status: SHIPPED | OUTPATIENT
Start: 2024-11-26

## 2024-11-26 RX ORDER — SPIRONOLACTONE 100 MG/1
100 TABLET, FILM COATED ORAL EVERY MORNING
Qty: 150 TABLET | Refills: 0 | Status: SHIPPED | OUTPATIENT
Start: 2024-11-26

## 2024-11-26 NOTE — TELEPHONE ENCOUNTER
spironolactone (ALDACTONE) 100 MG tablet 150 tablet 0 6/12/2024     tretinoin (RETIN-A) 0.025 % external cream 20 g 11 9/7/2023       Last Office Visit: 9/7/2373-Jhbofa-wt: 1 year or earlier if needed   Future Office visit:   none    Medications denied per process # 2 and # 1    Omayra Aguilar RN  P Central Nursing/Red Flag Triage & Med Refill Team

## 2025-02-26 DIAGNOSIS — L70.0 ACNE VULGARIS: ICD-10-CM

## 2025-03-04 RX ORDER — SPIRONOLACTONE 100 MG/1
100 TABLET, FILM COATED ORAL EVERY MORNING
Qty: 150 TABLET | Refills: 0 | OUTPATIENT
Start: 2025-03-04

## 2025-03-04 NOTE — TELEPHONE ENCOUNTER
LVD:  9/7/2023  [facility] Dermatology Clinic [place]      Medication: spironolactone  Refill decision: Medication denied per  Medication Refill in Ambulatory Care  policy.    Derm-Allergy Refill Protocol  Process #2    -Refill qty to 6 months from last clinic visit   -Refill with or without scheduled appointment, Refuse if over 6 months.    -No need to contact Clinic Coordinators about appointment unless over 6 mo.

## 2025-03-13 ENCOUNTER — OFFICE VISIT (OUTPATIENT)
Dept: DERMATOLOGY | Facility: CLINIC | Age: 55
End: 2025-03-13
Payer: COMMERCIAL

## 2025-03-13 DIAGNOSIS — D18.01 ANGIOMA OF SKIN: ICD-10-CM

## 2025-03-13 DIAGNOSIS — L70.0 ACNE VULGARIS: ICD-10-CM

## 2025-03-13 DIAGNOSIS — L82.1 SEBORRHEIC KERATOSIS: ICD-10-CM

## 2025-03-13 DIAGNOSIS — Z51.81 THERAPEUTIC DRUG MONITORING: Primary | ICD-10-CM

## 2025-03-13 DIAGNOSIS — D22.9 NEVUS: ICD-10-CM

## 2025-03-13 DIAGNOSIS — L81.4 LENTIGO: ICD-10-CM

## 2025-03-13 LAB
ANION GAP SERPL CALCULATED.3IONS-SCNC: 11 MMOL/L (ref 7–15)
BUN SERPL-MCNC: 11.6 MG/DL (ref 6–20)
CALCIUM SERPL-MCNC: 9.9 MG/DL (ref 8.8–10.4)
CHLORIDE SERPL-SCNC: 104 MMOL/L (ref 98–107)
CREAT SERPL-MCNC: 0.74 MG/DL (ref 0.51–0.95)
EGFRCR SERPLBLD CKD-EPI 2021: >90 ML/MIN/1.73M2
GLUCOSE SERPL-MCNC: 82 MG/DL (ref 70–99)
HCO3 SERPL-SCNC: 26 MMOL/L (ref 22–29)
POTASSIUM SERPL-SCNC: 4.3 MMOL/L (ref 3.4–5.3)
SODIUM SERPL-SCNC: 141 MMOL/L (ref 135–145)

## 2025-03-13 RX ORDER — SPIRONOLACTONE 100 MG/1
100 TABLET, FILM COATED ORAL EVERY MORNING
Qty: 90 TABLET | Refills: 3 | Status: SHIPPED | OUTPATIENT
Start: 2025-03-13

## 2025-03-13 RX ORDER — SPIRONOLACTONE 100 MG/1
100 TABLET, FILM COATED ORAL EVERY MORNING
Qty: 150 TABLET | Refills: 0 | Status: SHIPPED | OUTPATIENT
Start: 2025-03-13 | End: 2025-03-13

## 2025-03-13 RX ORDER — TRETINOIN 0.25 MG/G
CREAM TOPICAL
Qty: 20 G | Refills: 11 | Status: SHIPPED | OUTPATIENT
Start: 2025-03-13

## 2025-03-13 NOTE — PROGRESS NOTES
HPI:   Chief complaints: Eileen Valladares is a pleasant 54 year old female who presents for Full skin cancer screening to rule out skin cancer   Last Skin Exam: 2 years ago      1st Baseline: no  Personal HX of Skin Cancer: no   Personal HX of Malignant Melanoma: no   Family HX of Skin Cancer / Malignant Melanoma: mother with a history of bcc  Personal HX of Atypical Moles:   no  Risk factors: history of sun exposure and burns  New / Changing lesions:none  Social History:   On review of systems, there are no further skin complaints, patient is feeling otherwise well.   ROS of the following were done and are negative: Constitutional, Eyes, Ears, Nose,   Mouth, Throat, Cardiovascular, Respiratory, GI, Genitourinary, Musculoskeletal,   Psychiatric, Endocrine, Allergic/Immunologic.    PHYSICAL EXAM:   There were no vitals taken for this visit.  Skin exam performed as follows: Type 2 skin. Mood appropriate  Alert and Oriented X 3. Well developed, well nourished in no distress.  General appearance: Normal  Head including face: Normal  Eyes: conjunctiva and lids: Normal  Mouth: Lips, teeth, gums: Normal  Neck: Normal  Chest-breast/axillae: Normal  Back: Normal  Extremities: digits/nails (clubbing): Normal  Eccrine and Apocrine glands: Normal  Right upper extremity: Normal  Left upper extremity: Normal  Right lower extremity: Normal  Left lower extremity: Normal  Skin: Scalp and body hair: See below    Pt deferred exam of breasts, groin, buttocks: No    Other physical findings:  1. Multiple pigmented macules on extremities and trunk  2. Multiple pigmented macules on face, trunk and extremities  3. Multiple vascular papules on trunk, arms and legs  4. Multiple scattered keratotic plaques       Except as noted above, no other signs of skin cancer or melanoma.     ASSESSMENT/PLAN:   Benign Full skin cancer screening today. . Patient with history of none  Advised on monthly self exams and 1 year  Patient Education: Appropriate  brochures given.    Multiple benign appearing melanocytic nevi on arms, legs and trunk. Discussed ABCDEs of melanoma and sunscreen.   Multiple lentigos on arms, legs and trunk. Advised benign, no treatment needed.  Multiple scattered angiomas. Advised benign, no treatment needed.   Seborrheic keratosis on arms, legs and trunk. Advised benign, no treatment needed.  Acne vulgaris - doing great on sofia and tretinoin.   Androgenetic alopecia  --Start Viviscal or Nutrafol  --Discussed oral, topical minoxidil she declines          Follow-up: yearly for acne/PRN sooner/fse every 1-2 years    1.) Patient was asked about new and changing moles. YES  2.) Patient received a complete physical skin examination: YES  3.) Patient was counseled to perform a monthly self skin examination: YES  Scribed By: Randa Galvan MS, PA-C

## 2025-03-13 NOTE — LETTER
3/13/2025      Eileen Valladares  [address]  [place] [address]      Dear Colleague,    Thank you for referring your patient, Eileen Valladares, to the [facility]. Please see a copy of my visit note below.    HPI:   Chief complaints: Eileen Valladares is a pleasant 54 year old female who presents for Full skin cancer screening to rule out skin cancer   Last Skin Exam: 2 years ago      1st Baseline: no  Personal HX of Skin Cancer: no   Personal HX of Malignant Melanoma: no   Family HX of Skin Cancer / Malignant Melanoma: mother with a history of bcc  Personal HX of Atypical Moles:   no  Risk factors: history of sun exposure and burns  New / Changing lesions:none  Social History:   On review of systems, there are no further skin complaints, patient is feeling otherwise well.   ROS of the following were done and are negative: Constitutional, Eyes, Ears, Nose,   Mouth, Throat, Cardiovascular, Respiratory, GI, Genitourinary, Musculoskeletal,   Psychiatric, Endocrine, Allergic/Immunologic.    PHYSICAL EXAM:   There were no vitals taken for this visit.  Skin exam performed as follows: Type 2 skin. Mood appropriate  Alert and Oriented X 3. Well developed, well nourished in no distress.  General appearance: Normal  Head including face: Normal  Eyes: conjunctiva and lids: Normal  Mouth: Lips, teeth, gums: Normal  Neck: Normal  Chest-breast/axillae: Normal  Back: Normal  Extremities: digits/nails (clubbing): Normal  Eccrine and Apocrine glands: Normal  Right upper extremity: Normal  Left upper extremity: Normal  Right lower extremity: Normal  Left lower extremity: Normal  Skin: Scalp and body hair: See below    Pt deferred exam of breasts, groin, buttocks: No    Other physical findings:  1. Multiple pigmented macules on extremities and trunk  2. Multiple pigmented macules on face, trunk and extremities  3. Multiple vascular papules on trunk, arms and legs  4. Multiple scattered keratotic plaques        Except as noted above, no other signs of skin cancer or melanoma.     ASSESSMENT/PLAN:   Benign Full skin cancer screening today. . Patient with history of none  Advised on monthly self exams and 1 year  Patient Education: Appropriate brochures given.    Multiple benign appearing melanocytic nevi on arms, legs and trunk. Discussed ABCDEs of melanoma and sunscreen.   Multiple lentigos on arms, legs and trunk. Advised benign, no treatment needed.  Multiple scattered angiomas. Advised benign, no treatment needed.   Seborrheic keratosis on arms, legs and trunk. Advised benign, no treatment needed.  Acne vulgaris - doing great on sofia and tretinoin.   Androgenetic alopecia  --Start Viviscal or Nutrafol  --Discussed oral, topical minoxidil she declines          Follow-up: yearly for acne/PRN sooner/fse every 1-2 years    1.) Patient was asked about new and changing moles. YES  2.) Patient received a complete physical skin examination: YES  3.) Patient was counseled to perform a monthly self skin examination: YES  Scribed By: Randa Galvan, MS, PA-C      Again, thank you for allowing me to participate in the care of your patient.        Sincerely,        Randa Galvan PA-C    Electronically signed

## 2025-03-13 NOTE — PATIENT INSTRUCTIONS
Spot on foot: Dermatofibroma (Fibrous nodule) not cancerous. Can be caused by a past injury, ingrown hair, bug bites, etc.     Checking Potassium and liver function today to refill spironolactone.    Retin-A/Retinol/Tretinoin:   Use a pea sized amount each evening after washing and drying your face.   This can be drying to your face so use a moisturizer after applying retinol.   If too drying, you can try mixing in with a moisturizer or applying Retinol every other night.  Retinols can make you more sensitive/easily burnt by the sun. It is recommended that you wear a moisturizer with SPF.      Medications refilled today.    Collagen and spironolactone  an be good for hair growth for female pattern hair loss.  Can try topical or oral minoxidil, let us know if you would like to try this in the future.      Take over the counter Viviscal or Nutrafol  supplements. Follow the instructions listed on the box.      These should be available at most pharmacies or big box stores.               Proper skin care from [facility]:    -Eliminate harsh soaps as they strip the natural oils from the skin, often resulting in dry itchy skin ( i.e. Dial, Zest, Egyptian Spring)  -Use mild soaps such as Cetaphil or Dove Sensitive Skin in the shower. You do not need to use soap on arms, legs, and trunk every time you shower unless visibly soiled.   -Avoid hot or cold showers.  -After showering, lightly dry off and apply moisturizing within 2-3 minutes. This will help trap moisture in the skin.   -Aggressive use of a moisturizer at least 1-2 times a day to the entire body (including -Vanicream, Cetaphil, Aquaphor or Cerave) and moisturize hands after every washing.  -We recommend using moisturizers that come in a tub that needs to be scooped out, not a pump. This has more of an oil base. It will hold moisture in your skin much better than a water base moisturizer. The above recommended are non-pore clogging.      Wear a sunscreen  with at least SPF 30 on your face, ears, neck and V of the chest daily. Wear sunscreen on other areas of the body if those areas are exposed to the sun throughout the day. Sunscreens can contain physical and/or chemical blockers. Physical blockers are less likely to clog pores, these include zinc oxide and titanium dioxide. Reapply every two hour and after swimming.     Sunscreen examples: https://www.ewg.org/sunscreen/    UV radiation  UVA radiation remains constant throughout the day and throughout the year. It is a longer wavelength than UVB and therefore penetrates deeper into the skin leading to immediate and delayed tanning, photoaging, and skin cancer. 70-80% of UVA and UVB radiation occurs between the hours of 10am-2pm.  UVB radiation  UVB radiation causes the most harmful effects and is more significant during the summer months. However, snow and ice can reflect UVB radiation leading to skin damage during the winter months as well. UVB radiation is responsible for tanning, burning, inflammation, delayed erythema (pinkness), pigmentation (brown spots), and skin cancer.     I recommend self monthly full body exams and yearly full body exams with a dermatology provider. If you develop a new or changing lesion please follow up for examination. Most skin cancers are pink and scaly or pink and pearly. However, we do see blue/brown/black skin cancers.  Consider the ABCDEs of melanoma when giving yourself your monthly full body exam ( don't forget the groin, buttocks, feet, toes, etc). A-asymmetry, B-borders, C-color, D-diameter, E-elevation or evolving. If you see any of these changes please follow up in clinic. If you cannot see your back I recommend purchasing a hand held mirror to use with a larger wall mirror.       Checking for Skin Cancer  You can find cancer early by checking your skin each month. There are 3 kinds of skin cancer. They are melanoma, basal cell carcinoma, and squamous cell carcinoma. Doing  monthly skin checks is the best way to find new marks or skin changes. Follow the instructions below for checking your skin.   The ABCDEs of checking moles for melanoma   Check your moles or growths for signs of melanoma using ABCDE:   Asymmetry: the sides of the mole or growth don t match  Border: the edges are ragged, notched, or blurred  Color: the color within the mole or growth varies  Diameter: the mole or growth is larger than 6 mm (size of a pencil eraser)  Evolving: the size, shape, or color of the mole or growth is changing (evolving is not shown in the images below)    Checking for other types of skin cancer  Basal cell carcinoma or squamous cell carcinoma have symptoms such as:     A spot or mole that looks different from all other marks on your skin  Changes in how an area feels, such as itching, tenderness, or pain  Changes in the skin's surface, such as oozing, bleeding, or scaliness  A sore that does not heal  New swelling or redness beyond the border of a mole    Who s at risk?  Anyone can get skin cancer. But you are at greater risk if you have:   Fair skin, light-colored hair, or light-colored eyes  Many moles or abnormal moles on your skin  A history of sunburns from sunlight or tanning beds  A family history of skin cancer  A history of exposure to radiation or chemicals  A weakened immune system  If you have had skin cancer in the past, you are at risk for recurring skin cancer.   How to check your skin  Do your monthly skin checkups in front of a full-length mirror. Check all parts of your body, including your:   Head (ears, face, neck, and scalp)  Torso (front, back, and sides)  Arms (tops, undersides, upper, and lower armpits)  Hands (palms, backs, and fingers, including under the nails)  Buttocks and genitals  Legs (front, back, and sides)  Feet (tops, soles, toes, including under the nails, and between toes)  If you have a lot of moles, take digital photos of them each month. Make sure to  take photos both up close and from a distance. These can help you see if any moles change over time.   Most skin changes are not cancer. But if you see any changes in your skin, call your doctor right away. Only he or she can diagnose a problem. If you have skin cancer, seeing your doctor can be the first step toward getting the treatment that could save your life.   [employer] last reviewed this educational content on 4/1/2019 2000-2020 [employer], [employer] [address] [place] [address], [place] [address]. All rights reserved. This information is not intended as a substitute for professional medical care. Always follow your healthcare professional's instructions.       When should I call my doctor?  If you are worsening or not improving, please, contact us or seek urgent care as noted below.     Who should I call with questions (adults)?    [facility] [phone]  For urgent needs outside of business hours call the [facility] at [phone] and ask for the dermatology resident on call to be paged  If this is a medical emergency and you are unable to reach an ER, Call [phone]      If you need a prescription refill, please contact your pharmacy. Refills are approved or denied by our Physicians during normal business hours, Monday through Friday.  Per office policy, refills will not be granted if you have not been seen within the past year (or sooner depending on the condition).

## 2025-04-16 ENCOUNTER — ANCILLARY PROCEDURE (OUTPATIENT)
Dept: GENERAL RADIOLOGY | Facility: CLINIC | Age: 55
End: 2025-04-16
Attending: PEDIATRICS
Payer: COMMERCIAL

## 2025-04-16 ENCOUNTER — OFFICE VISIT (OUTPATIENT)
Dept: ORTHOPEDICS | Facility: CLINIC | Age: 55
End: 2025-04-16
Payer: COMMERCIAL

## 2025-04-16 DIAGNOSIS — M25.512 CHRONIC LEFT SHOULDER PAIN: Primary | ICD-10-CM

## 2025-04-16 DIAGNOSIS — G89.29 CHRONIC LEFT SHOULDER PAIN: Primary | ICD-10-CM

## 2025-04-16 DIAGNOSIS — M65.20 CALCIFIC TENDONITIS: ICD-10-CM

## 2025-04-16 DIAGNOSIS — M75.102 ROTATOR CUFF SYNDROME OF LEFT SHOULDER: ICD-10-CM

## 2025-04-16 DIAGNOSIS — M25.512 LEFT SHOULDER PAIN: ICD-10-CM

## 2025-04-16 PROCEDURE — 73030 X-RAY EXAM OF SHOULDER: CPT | Mod: TC | Performed by: RADIOLOGY

## 2025-04-16 PROCEDURE — 99203 OFFICE O/P NEW LOW 30 MIN: CPT | Performed by: PEDIATRICS

## 2025-04-16 NOTE — PROGRESS NOTES
ASSESSMENT & PLAN    Eileen was seen today for pain.    Diagnoses and all orders for this visit:    Chronic left shoulder pain  -     XR Shoulder Left G/E 3 Views; Future  -     Physical Therapy  Referral; Future    Rotator cuff syndrome of left shoulder  -     Physical Therapy  Referral; Future    Calcific tendonitis  -     Physical Therapy  Referral; Future      This issue is acute on chronic and Unchanged.        ICD-10-CM    1. Chronic left shoulder pain  M25.512 XR Shoulder Left G/E 3 Views    G89.29 Physical Therapy  Referral      2. Rotator cuff syndrome of left shoulder  M75.102 Physical Therapy  Referral      3. Calcific tendonitis  M65.20 Physical Therapy  Referral        Patient Instructions   Low suspicion for rotator cuff tear given current history and exam.  Recommended rest from irritating activities coupled with physical therapy.  Would consider further imaging or treatment pending clinical course.    Plan:  - Today's Plan of Care:  Rehab: Physical Therapy: [facility] - [phone]    Discussed activity considerations and other supportive care including Ice/Heat, OTC and other topical medications as needed.    -We also discussed other future treatment options:  Consideration of corticosteroid injection - likely subacromial to start  MRI if not improving    Follow Up: 6 - 8 weeks    Concerning signs and symptoms were reviewed and all questions were answered at this time.    Kalyn Ruiz MD [facility]  Sports Medicine Physician  [facility]    -----  Chief Complaint   Patient presents with    Left Shoulder - Pain       SUBJECTIVE  Eileen Valladares is a/an 54 year old female who is seen as a self referral for evaluation of LEFT shoulder.     The patient is seen by themselves.  The patient is Right handed    Onset: Has been worsening over the last 2 months. Reports insidious onset without acute precipitating event. Maybe  started with more upper extremity lifting.  Location of Pain: left shoulder; down the shoulder, lateral, RTC area   Worsened by: sleeping on the left side, lifting a purse, lateral raise, flexion, some trouble with overhead movements   Better with: nothing yet  Treatments tried: ice, ibuprofen  Associated symptoms: pain with movements     Orthopedic/Surgical history: YES - Date: has previously had RIGHT shoulder pain and problems. She was doing physical therapy in 2019 from [facility]. Per notes, MRI with calcific tendonitis, subacromial injection 10/26/2018 with Dr. Rodriguez  Social History/Occupation: office job       REVIEW OF SYSTEMS:  Review of Systems    OBJECTIVE:  There were no vitals taken for this visit.   General: healthy, alert and in no distress  Skin: no suspicious lesions or rash.  CV: distal perfusion intact   Resp: normal respiratory effort without conversational dyspnea   Psych: normal mood and affect  Gait: NORMAL  Neuro: Normal light sensory exam of upper extremity    Left Shoulder exam    Inspection and Posture:       normal    Skin:        no visible deformities    Tender:        subacromial space left    Non Tender:       remainder of shoulder left    ROM:        Slightly decreased left shoulder motion compared to right       asymmetric scapular motion    Painful motions:       end range flexion and elevation left    Strength:        abduction 5/5 bilateral       flexion 5/5 bilateral       internal rotation 5/5 bilateral       external rotation 5/5 bilateral    Impingement testing:       neg (-) Neer left       positive (+) Rivas left       neg (-) O'marylin left    Sensation:        normal sensation over shoulder and upper extremity        RADIOLOGY:  Final results and radiologist's interpretation, available in the [facility] health record.  Images were reviewed with the patient in the office today.  My personal interpretation of the performed imaging:     3 XR views of left shoulder reviewed: no acute  bony abnormality, calcifications in RC tendons, no significant degenerative change  - will follow official read    Reviewed prior [facility] notes  Review of the result(s) of each unique test - XR

## 2025-04-16 NOTE — LETTER
4/16/2025      Eileen Valladares  [address]  [place] [address]      Dear Colleague,    Thank you for referring your patient, Eileen Valladares, to the [facility] [place]. Please see a copy of my visit note below.    ASSESSMENT & PLAN    Eileen was seen today for pain.    Diagnoses and all orders for this visit:    Chronic left shoulder pain  -     XR Shoulder Left G/E 3 Views; Future  -     Physical Therapy  Referral; Future    Rotator cuff syndrome of left shoulder  -     Physical Therapy  Referral; Future    Calcific tendonitis  -     Physical Therapy  Referral; Future      This issue is acute on chronic and Unchanged.        ICD-10-CM    1. Chronic left shoulder pain  M25.512 XR Shoulder Left G/E 3 Views    G89.29 Physical Therapy  Referral      2. Rotator cuff syndrome of left shoulder  M75.102 Physical Therapy  Referral      3. Calcific tendonitis  M65.20 Physical Therapy  Referral        Patient Instructions   Low suspicion for rotator cuff tear given current history and exam.  Recommended rest from irritating activities coupled with physical therapy.  Would consider further imaging or treatment pending clinical course.    Plan:  - Today's Plan of Care:  Rehab: Physical Therapy: [facility] - [phone]    Discussed activity considerations and other supportive care including Ice/Heat, OTC and other topical medications as needed.    -We also discussed other future treatment options:  Consideration of corticosteroid injection - likely subacromial to start  MRI if not improving    Follow Up: 6 - 8 weeks    Concerning signs and symptoms were reviewed and all questions were answered at this time.    Kalyn Ruiz MD [facility]  Sports Medicine Physician  [facility]    -----  Chief Complaint   Patient presents with     Left Shoulder - Pain       SUBJECTIVE  Eileen Valladares is a/an 54 year old female who is  seen as a self referral for evaluation of LEFT shoulder.     The patient is seen by themselves.  The patient is Right handed    Onset: Has been worsening over the last 2 months. Reports insidious onset without acute precipitating event. Maybe started with more upper extremity lifting.  Location of Pain: left shoulder; down the shoulder, lateral, RTC area   Worsened by: sleeping on the left side, lifting a purse, lateral raise, flexion, some trouble with overhead movements   Better with: nothing yet  Treatments tried: ice, ibuprofen  Associated symptoms: pain with movements     Orthopedic/Surgical history: YES - Date: has previously had RIGHT shoulder pain and problems. She was doing physical therapy in 2019 from [facility]. Per notes, MRI with calcific tendonitis, subacromial injection 10/26/2018 with Dr. Rodriguez  Social History/Occupation: office job       REVIEW OF SYSTEMS:  Review of Systems    OBJECTIVE:  There were no vitals taken for this visit.   General: healthy, alert and in no distress  Skin: no suspicious lesions or rash.  CV: distal perfusion intact   Resp: normal respiratory effort without conversational dyspnea   Psych: normal mood and affect  Gait: NORMAL  Neuro: Normal light sensory exam of upper extremity    Left Shoulder exam    Inspection and Posture:       normal    Skin:        no visible deformities    Tender:        subacromial space left    Non Tender:       remainder of shoulder left    ROM:        Slightly decreased left shoulder motion compared to right       asymmetric scapular motion    Painful motions:       end range flexion and elevation left    Strength:        abduction 5/5 bilateral       flexion 5/5 bilateral       internal rotation 5/5 bilateral       external rotation 5/5 bilateral    Impingement testing:       neg (-) Neer left       positive (+) Rivas left       neg (-) O'marylin left    Sensation:        normal sensation over shoulder and upper extremity        RADIOLOGY:  Final  results and radiologist's interpretation, available in the [facility] health record.  Images were reviewed with the patient in the office today.  My personal interpretation of the performed imaging:     3 XR views of left shoulder reviewed: no acute bony abnormality, calcifications in RC tendons, no significant degenerative change  - will follow official read    Reviewed prior [facility] notes  Review of the result(s) of each unique test - XR             Again, thank you for allowing me to participate in the care of your patient.        Sincerely,        Kalyn Ruiz MD    Electronically signed

## 2025-04-16 NOTE — PATIENT INSTRUCTIONS
Low suspicion for rotator cuff tear given current history and exam.  Recommended rest from irritating activities coupled with physical therapy.  Would consider further imaging or treatment pending clinical course.    Plan:  - Today's Plan of Care:  [facility]hab: [facility] [phone]    Discussed activity considerations and other supportive care including Ice/Heat, OTC and other topical medications as needed.    -We also discussed other future treatment options:  Consideration of corticosteroid injection - likely subacromial to start  MRI if not improving    Follow Up: 6 - 8 weeks    If you have any further questions for your physician or physician s care team you can call [phone].

## 2025-04-23 ASSESSMENT — ACTIVITIES OF DAILY LIVING (ADL)
PUTTING_ON_AN_UNDERSHIRT_OR_A_PULLOVER_SWEATER: 1
WASHING_YOUR_HAIR?: 1
PLEASE_INDICATE_YOR_PRIMARY_REASON_FOR_REFERRAL_TO_THERAPY:: SHOULDER
PLACING_AN_OBJECT_ON_A_HIGH_SHELF: 1
PUTTING_ON_YOUR_PANTS: 0
WASHING_YOUR_BACK: 0
TOUCHING_THE_BACK_OF_YOUR_NECK: 0
REMOVING_SOMETHING_FROM_YOUR_BACK_POCKET: 0
REACHING_FOR_SOMETHING_ON_A_HIGH_SHELF: 5
CARRYING_A_HEAVY_OBJECT_OF_10_POUNDS: 2
AT_ITS_WORST?: 6
PUSHING_WITH_THE_INVOLVED_ARM: 1
WHEN_LYING_ON_THE_INVOLVED_SIDE: 8
PUTTING_ON_A_SHIRT_THAT_BUTTONS_DOWN_THE_FRONT: 1

## 2025-04-24 ENCOUNTER — THERAPY VISIT (OUTPATIENT)
Dept: PHYSICAL THERAPY | Facility: CLINIC | Age: 55
End: 2025-04-24
Attending: PEDIATRICS
Payer: COMMERCIAL

## 2025-04-24 DIAGNOSIS — M75.102 ROTATOR CUFF SYNDROME OF LEFT SHOULDER: ICD-10-CM

## 2025-04-24 DIAGNOSIS — G89.29 CHRONIC LEFT SHOULDER PAIN: ICD-10-CM

## 2025-04-24 DIAGNOSIS — M25.512 CHRONIC LEFT SHOULDER PAIN: ICD-10-CM

## 2025-04-24 DIAGNOSIS — M65.20 CALCIFIC TENDONITIS: ICD-10-CM

## 2025-04-24 PROCEDURE — 97110 THERAPEUTIC EXERCISES: CPT | Mod: GP | Performed by: PHYSICAL THERAPIST

## 2025-04-24 PROCEDURE — 97140 MANUAL THERAPY 1/> REGIONS: CPT | Mod: GP | Performed by: PHYSICAL THERAPIST

## 2025-04-24 PROCEDURE — 97161 PT EVAL LOW COMPLEX 20 MIN: CPT | Mod: GP | Performed by: PHYSICAL THERAPIST

## 2025-04-24 NOTE — PROGRESS NOTES
PHYSICAL THERAPY EVALUATION  Type of Visit: Evaluation        Fall Risk Screen:  Have you fallen 2 or more times in the past year?: No  Have you fallen and had an injury in the past year?: No  Is patient receiving Physical Therapy Services?: No    Subjective   L shoulder pain into upper arm, painful lifting overhead, started doing some free weights over the winter, maybe that irritated it.  Sx sleeping on L side      Presenting condition or subjective complaint: Left shoulder pain  Date of onset:      Relevant medical history:     Dates & types of surgery:      Prior diagnostic imaging/testing results: X-ray     Prior therapy history for the same diagnosis, illness or injury: Yes Right shoulder [id number]    Prior Level of Function  Transfers: Independent  Ambulation: Independent  ADL: Independent  IADL:     Living Environment  Social support: With a significant other or spouse   Type of home: House   Stairs to enter the home: Yes 2 Is there a railing: No     Ramp: No   Stairs inside the home: Yes 10 Is there a railing: Yes     Help at home: None  Equipment owned:       Employment: Yes Pharmacy  desk work  Hobbies/Interests: Travel    Patient goals for therapy: Reach overhead without pain    Pain assessment:      Objective   SHOULDER EVALUATION  PAIN: 6/10  INTEGUMENTARY (edema, incisions):   POSTURE: anterior shoulder position, protracted scap B  GAIT:   Weightbearing Status:   Assistive Device(s):   Gait Deviations:   BALANCE/PROPRIOCEPTION:   WEIGHTBEARING ALIGNMENT:   ROM: min loss L shoulder flexion pain end range, * pain, IR WNL approx T6, PROM L ER 30* in neutral 70* at abd45, 90* at abd90, R *+ at abd90, 45 in neutral    STRENGTH: ER L4/5 pain, R 5, shld flex 4+ mild pain  FLEXIBILITY:   SPECIAL TESTS: inpingement +L, empty can +L  PALPATION: very tight L upper trap region, tender sup/ant shld  JOINT MOBILITY: L GH post glide hypomob  CERVICAL SCREEN: normal    Assessment & Plan    CLINICAL IMPRESSIONS  Medical Diagnosis: chornic left shoulder pain    Treatment Diagnosis:     Impression/Assessment: Patient is a 54 year old female with L shoulder  complaints.  The following significant findings have been identified: Pain, Decreased ROM/flexibility, Decreased joint mobility, and Decreased strength. These impairments interfere with their ability to perform recreational activities and household chores as compared to previous level of function.     Clinical Decision Making (Complexity):  Clinical Presentation: Stable/Uncomplicated  Clinical Presentation Rationale: based on medical and personal factors listed in PT evaluation  Clinical Decision Making (Complexity): Low complexity    PLAN OF CARE  Treatment Interventions:  Interventions: Manual Therapy, Neuromuscular Re-education, Therapeutic Activity, Therapeutic Exercise, Self-Care/Home Management    Long Term Goals     PT Goal 1  Goal Identifier: 1  Goal Description: Will have full painfree AROM for improved ADL's, reaching overhead cupboards in 6wk  Target Date: 06/05/25  PT Goal 2  Goal Identifier: 2  Goal Description: will be able to sleep on side notwaking from shoulder pain in 6wk  Target Date: 06/05/25      Frequency of Treatment: 1x/wk  Duration of Treatment: 6wks    Recommended Referrals to Other Professionals:   Education Assessment:   Learner/Method: Patient;Pictures/Video;No Barriers to Learning    Risks and benefits of evaluation/treatment have been explained.   Patient/Family/caregiver agrees with Plan of Care.     Evaluation Time:     PT Eval, Low Complexity Minutes (66974): 15       Signing Clinician: Kris Hoenk, PT

## 2025-05-07 ENCOUNTER — THERAPY VISIT (OUTPATIENT)
Dept: PHYSICAL THERAPY | Facility: CLINIC | Age: 55
End: 2025-05-07
Attending: PEDIATRICS
Payer: COMMERCIAL

## 2025-05-07 DIAGNOSIS — G89.29 CHRONIC LEFT SHOULDER PAIN: Primary | ICD-10-CM

## 2025-05-07 DIAGNOSIS — M25.512 CHRONIC LEFT SHOULDER PAIN: Primary | ICD-10-CM

## 2025-05-07 PROCEDURE — 97140 MANUAL THERAPY 1/> REGIONS: CPT | Mod: GP

## 2025-05-07 PROCEDURE — 97110 THERAPEUTIC EXERCISES: CPT | Mod: GP

## 2025-05-22 ENCOUNTER — THERAPY VISIT (OUTPATIENT)
Dept: PHYSICAL THERAPY | Facility: CLINIC | Age: 55
End: 2025-05-22
Attending: PEDIATRICS
Payer: COMMERCIAL

## 2025-05-22 DIAGNOSIS — M25.512 CHRONIC LEFT SHOULDER PAIN: Primary | ICD-10-CM

## 2025-05-22 DIAGNOSIS — G89.29 CHRONIC LEFT SHOULDER PAIN: Primary | ICD-10-CM

## 2025-05-22 PROCEDURE — 97110 THERAPEUTIC EXERCISES: CPT | Mod: GP | Performed by: PHYSICAL THERAPIST

## 2025-05-22 PROCEDURE — 97140 MANUAL THERAPY 1/> REGIONS: CPT | Mod: GP | Performed by: PHYSICAL THERAPIST

## 2025-06-05 ENCOUNTER — THERAPY VISIT (OUTPATIENT)
Dept: PHYSICAL THERAPY | Facility: CLINIC | Age: 55
End: 2025-06-05
Attending: PEDIATRICS
Payer: COMMERCIAL

## 2025-06-05 DIAGNOSIS — G89.29 CHRONIC LEFT SHOULDER PAIN: Primary | ICD-10-CM

## 2025-06-05 DIAGNOSIS — M25.512 CHRONIC LEFT SHOULDER PAIN: Primary | ICD-10-CM

## 2025-06-05 PROCEDURE — 97110 THERAPEUTIC EXERCISES: CPT | Mod: GP | Performed by: PHYSICAL THERAPIST

## 2025-06-11 SDOH — HEALTH STABILITY: PHYSICAL HEALTH: ON AVERAGE, HOW MANY MINUTES DO YOU ENGAGE IN EXERCISE AT THIS LEVEL?: 30 MIN

## 2025-06-11 SDOH — HEALTH STABILITY: PHYSICAL HEALTH: ON AVERAGE, HOW MANY DAYS PER WEEK DO YOU ENGAGE IN MODERATE TO STRENUOUS EXERCISE (LIKE A BRISK WALK)?: 2 DAYS

## 2025-06-11 ASSESSMENT — SOCIAL DETERMINANTS OF HEALTH (SDOH): HOW OFTEN DO YOU GET TOGETHER WITH FRIENDS OR RELATIVES?: ONCE A WEEK

## 2025-06-18 ENCOUNTER — RESULTS FOLLOW-UP (OUTPATIENT)
Dept: FAMILY MEDICINE | Facility: CLINIC | Age: 55
End: 2025-06-18

## 2025-06-18 ENCOUNTER — OFFICE VISIT (OUTPATIENT)
Dept: FAMILY MEDICINE | Facility: CLINIC | Age: 55
End: 2025-06-18
Payer: COMMERCIAL

## 2025-06-18 VITALS
HEART RATE: 85 BPM | TEMPERATURE: 98.4 F | SYSTOLIC BLOOD PRESSURE: 120 MMHG | DIASTOLIC BLOOD PRESSURE: 62 MMHG | WEIGHT: 176 LBS | OXYGEN SATURATION: 98 % | HEIGHT: 67 IN | BODY MASS INDEX: 27.62 KG/M2 | RESPIRATION RATE: 16 BRPM

## 2025-06-18 DIAGNOSIS — Z82.49 FAMILY HISTORY OF ISCHEMIC HEART DISEASE: ICD-10-CM

## 2025-06-18 DIAGNOSIS — Z13.1 SCREENING FOR DIABETES MELLITUS: ICD-10-CM

## 2025-06-18 DIAGNOSIS — Z00.00 ROUTINE GENERAL MEDICAL EXAMINATION AT A HEALTH CARE FACILITY: Primary | ICD-10-CM

## 2025-06-18 DIAGNOSIS — Z12.11 SCREEN FOR COLON CANCER: ICD-10-CM

## 2025-06-18 DIAGNOSIS — Z83.49 FAMILY HISTORY OF THYROID DISEASE: ICD-10-CM

## 2025-06-18 DIAGNOSIS — Z13.6 SCREENING FOR CARDIOVASCULAR CONDITION: ICD-10-CM

## 2025-06-18 LAB
CHOLEST SERPL-MCNC: 186 MG/DL
FASTING STATUS PATIENT QL REPORTED: YES
FASTING STATUS PATIENT QL REPORTED: YES
GLUCOSE SERPL-MCNC: 89 MG/DL (ref 70–99)
HDLC SERPL-MCNC: 66 MG/DL
LDLC SERPL CALC-MCNC: 111 MG/DL
NONHDLC SERPL-MCNC: 120 MG/DL
TRIGL SERPL-MCNC: 44 MG/DL
TSH SERPL DL<=0.005 MIU/L-ACNC: 1.4 UIU/ML (ref 0.3–4.2)

## 2025-06-18 PROCEDURE — 80061 LIPID PANEL: CPT | Performed by: NURSE PRACTITIONER

## 2025-06-18 PROCEDURE — 90472 IMMUNIZATION ADMIN EACH ADD: CPT | Performed by: NURSE PRACTITIONER

## 2025-06-18 PROCEDURE — 36415 COLL VENOUS BLD VENIPUNCTURE: CPT | Performed by: NURSE PRACTITIONER

## 2025-06-18 PROCEDURE — 90471 IMMUNIZATION ADMIN: CPT | Performed by: NURSE PRACTITIONER

## 2025-06-18 PROCEDURE — 1126F AMNT PAIN NOTED NONE PRSNT: CPT | Performed by: NURSE PRACTITIONER

## 2025-06-18 PROCEDURE — 3074F SYST BP LT 130 MM HG: CPT | Performed by: NURSE PRACTITIONER

## 2025-06-18 PROCEDURE — 90715 TDAP VACCINE 7 YRS/> IM: CPT | Performed by: NURSE PRACTITIONER

## 2025-06-18 PROCEDURE — 82947 ASSAY GLUCOSE BLOOD QUANT: CPT | Performed by: NURSE PRACTITIONER

## 2025-06-18 PROCEDURE — 3078F DIAST BP <80 MM HG: CPT | Performed by: NURSE PRACTITIONER

## 2025-06-18 PROCEDURE — 84443 ASSAY THYROID STIM HORMONE: CPT | Performed by: NURSE PRACTITIONER

## 2025-06-18 PROCEDURE — 90677 PCV20 VACCINE IM: CPT | Performed by: NURSE PRACTITIONER

## 2025-06-18 PROCEDURE — 99386 PREV VISIT NEW AGE 40-64: CPT | Mod: 25 | Performed by: NURSE PRACTITIONER

## 2025-06-18 RX ORDER — ESTRADIOL 0.1 MG/G
CREAM VAGINAL
COMMUNITY
Start: 2025-05-05

## 2025-06-18 ASSESSMENT — PATIENT HEALTH QUESTIONNAIRE - PHQ9
SUM OF ALL RESPONSES TO PHQ QUESTIONS 1-9: 9
10. IF YOU CHECKED OFF ANY PROBLEMS, HOW DIFFICULT HAVE THESE PROBLEMS MADE IT FOR YOU TO DO YOUR WORK, TAKE CARE OF THINGS AT HOME, OR GET ALONG WITH OTHER PEOPLE: SOMEWHAT DIFFICULT
SUM OF ALL RESPONSES TO PHQ QUESTIONS 1-9: 9

## 2025-06-18 ASSESSMENT — PAIN SCALES - GENERAL: PAINLEVEL_OUTOF10: NO PAIN (0)

## 2025-06-18 NOTE — NURSING NOTE
Prior to immunization administration, verified patients identity using patient s name and date of birth. Please see Immunization Activity for additional information.     Screening Questionnaire for Adult Immunization    Are you sick today?   No   Do you have allergies to medications, food, a vaccine component or latex?   No   Have you ever had a serious reaction after receiving a vaccination?   No   Do you have a long-term health problem with heart, lung, kidney, or metabolic disease (e.g., diabetes), asthma, a blood disorder, no spleen, complement component deficiency, a cochlear implant, or a spinal fluid leak?  Are you on long-term aspirin therapy?   No   Do you have cancer, leukemia, HIV/AIDS, or any other immune system problem?   No   Do you have a parent, brother, or sister with an immune system problem?   No   In the past 3 months, have you taken medications that affect  your immune system, such as prednisone, other steroids, or anticancer drugs; drugs for the treatment of rheumatoid arthritis, Crohn s disease, or psoriasis; or have you had radiation treatments?   No   Have you had a seizure, or a brain or other nervous system problem?   No   During the past year, have you received a transfusion of blood or blood    products, or been given immune (gamma) globulin or antiviral drug?   No   For women: Are you pregnant or is there a chance you could become       pregnant during the next month?   No   Have you received any vaccinations in the past 4 weeks?   No     Immunization questionnaire answers were all negative.      Patient instructed to remain in clinic for 15 minutes afterwards, and to report any adverse reactions.     Screening performed by Bibiana Knapp CMA on 6/18/2025 at 8:20 AM.

## 2025-06-18 NOTE — PROGRESS NOTES
"Preventive Care Visit  [facility]  SUSANNAH Sood CNP, Family Medicine  Jun 18, 2025        Assessment & Plan     Routine general medical examination at a health care facility    Screening for cardiovascular condition  Discussed cardiovascular risk screening/stratification.  She is concerned about her FH or CVD.  Will screen for hyperlipidemia today.  She is also interested in CT CAC scan.  Further plan pending results.  - Lipid panel reflex to direct LDL Fasting; Future  - CT Coronary Calcium Scan; Future    Family history of ischemic heart disease  - CT Coronary Calcium Scan; Future    Screen for colon cancer  - Colonoscopy Screening  Referral; Future    Family history of thyroid disease  Mother with thyroid disease.  - TSH with free T4 reflex; Future    Screening for diabetes mellitus  - Glucose; Future        BMI  Estimated body mass index is 27.57 kg/m  as calculated from the following:    Height as of this encounter: 1.702 m (5' 7\").    Weight as of this encounter: 79.8 kg (176 lb).   Weight management plan: Discussed healthy diet and exercise guidelines  Reviewed preventive health counseling, as reflected in patient instructions  Counseling  Appropriate preventive services were addressed with this patient via screening, questionnaire, or discussion as appropriate for fall prevention, nutrition, physical activity, Tobacco-use cessation, social engagement, weight loss and cognition.  Checklist reviewing preventive services available has been given to the patient.  Reviewed patient's diet, addressing concerns and/or questions.   She is at risk for lack of exercise and has been provided with information to increase physical activity for the benefit of her well-being.   She is at risk for psychosocial distress and has been provided with information to reduce risk.       The risks, benefits and treatment options of prescribed medications or other treatments have been " discussed with the patient. The patient verbalized their understanding and should call or follow up if no improvement or if they develop further problems.  Iliana Zapien, AROLDO                Subjective   Eileen is a 54 year old, presenting for the following:  Physical and Health Maintenance (Hysterectomy 2021-  fibroids;  pap smear no longer indicated.- provider to update health maintenance as appropriate.)        6/18/2025     7:46 AM   Additional Questions   Roomed by Bibiana TREVIZO   Accompanied by self         6/18/2025     7:46 AM   Patient Reported Additional Medications   Patient reports taking the following new medications none          HPI   No medication refills needed.        Advance Care Planning    Discussed advance care planning with patient; informed AVS has link to [facility].        6/11/2025   General Health   How would you rate your overall physical health? Good   Feel stress (tense, anxious, or unable to sleep) Very much   (!) STRESS CONCERN      6/11/2025   Nutrition   Three or more servings of calcium each day? (!) NO   Diet: Regular (no restrictions)   How many servings of fruit and vegetables per day? (!) 2-3   How many sweetened beverages each day? 0-1         6/11/2025   Exercise   Days per week of moderate/strenous exercise 2 days   Average minutes spent exercising at this level 30 min   (!) EXERCISE CONCERN      6/11/2025   Social Factors   Frequency of gathering with friends or relatives Once a week   Worry food won't last until get money to buy more No   Food not last or not have enough money for food? No   Do you have housing? (Housing is defined as stable permanent housing and does not include staying outside in a car, in a tent, in an abandoned building, in an overnight shelter, or couch-surfing.) Yes   Are you worried about losing your housing? No   Lack of transportation? No   Unable to get utilities (heat,electricity)? No         6/11/2025   Fall Risk   Fallen 2 or more times in  the past year? No   Trouble with walking or balance? No          6/11/2025   Dental   Dentist two times every year? Yes       Today's PHQ-9 Score:       6/18/2025     7:41 AM   PHQ-9 SCORE   PHQ-9 Total Score MyChart 9 (Mild depression)   PHQ-9 Total Score 9        Patient-reported         6/11/2025   Substance Use   Alcohol more than 3/day or more than 7/wk No   Do you use any other substances recreationally? No     Social History     Tobacco Use    Smoking status: Never    Smokeless tobacco: Never   Vaping Use    Vaping status: Never Used   Substance Use Topics    Alcohol use: Yes     Alcohol/week: 1.0 standard drink of alcohol     Types: 1 Standard drinks or equivalent per week     Comment: social maybe once or twice per month    Drug use: No           3/20/2023   LAST FHS-7 RESULTS   1st degree relative breast or ovarian cancer No   Any relative bilateral breast cancer No   Any male have breast cancer No   Any ONE woman have BOTH breast AND ovarian cancer No   Any woman with breast cancer before 50yrs No   2 or more relatives with breast AND/OR ovarian cancer No   2 or more relatives with breast AND/OR bowel cancer No                  6/11/2025   One time HIV Screening   Previous HIV test? Yes         6/11/2025   STI Screening   New sexual partner(s) since last STI/HIV test? No     History of abnormal Pap smear: Status post hysterectomy with removal of cervix and no history of CIN2 or greater or cervical cancer. Health Maintenance and Surgical History updated.       ASCVD Risk   The ASCVD Risk score (Bart MENDIETA, et al., 2019) failed to calculate for the following reasons:    Cannot find a previous HDL lab    Cannot find a previous total cholesterol lab           Reviewed and updated as needed this visit by Provider                          Review of Systems  Constitutional, HEENT, cardiovascular, pulmonary, GI, , musculoskeletal, neuro, skin, endocrine and psych systems are negative, except as  "otherwise noted.     Objective    Exam  /62 (BP Location: Right arm, Patient Position: Sitting, Cuff Size: Adult Large)   Pulse 85   Temp 98.4  F (36.9  C) (Tympanic)   Resp 16   Ht 1.702 m (5' 7\")   Wt 79.8 kg (176 lb)   LMP  (LMP Unknown)   SpO2 98%   BMI 27.57 kg/m     Estimated body mass index is 27.57 kg/m  as calculated from the following:    Height as of this encounter: 1.702 m (5' 7\").    Weight as of this encounter: 79.8 kg (176 lb).    Physical Exam  GENERAL: alert and no distress  EYES: Eyes grossly normal to inspection, PERRL and conjunctivae and sclerae normal  HENT: ear canals and TM's normal, nose and mouth without ulcers or lesions  NECK: no adenopathy, no asymmetry, masses, or scars  RESP: lungs clear to auscultation - no rales, rhonchi or wheezes  BREAST: normal without masses, tenderness or nipple discharge and no palpable axillary masses or adenopathy  CV: regular rate and rhythm, normal S1 S2, no S3 or S4, no murmur, click or rub, no peripheral edema  ABDOMEN: soft, nontender, no hepatosplenomegaly, no masses and bowel sounds normal  MS: no gross musculoskeletal defects noted, no edema  SKIN: no suspicious lesions or rashes  NEURO: Normal strength and tone, mentation intact and speech normal  PSYCH: mentation appears normal, affect normal/bright        Signed Electronically by: SUSANNAH Sood CNP    "

## 2025-06-18 NOTE — PATIENT INSTRUCTIONS
Patient Education   Preventive Care Advice   This is general advice given by our system to help you stay healthy. However, your care team may have specific advice just for you. Please talk to your care team about your preventive care needs.  Nutrition  Eat 5 or more servings of fruits and vegetables each day.  Try wheat bread, brown rice and whole grain pasta (instead of white bread, rice, and pasta).  Get enough calcium and vitamin D. Check the label on foods and aim for 100% of the RDA (recommended daily allowance).  Lifestyle  Exercise at least 150 minutes each week  (30 minutes a day, 5 days a week).  Do muscle strengthening activities 2 days a week. These help control your weight and prevent disease.  No smoking.  Wear sunscreen to prevent skin cancer.  Have a dental exam and cleaning every 6 months.  Yearly exams  See your health care team every year to talk about:  Any changes in your health.  Any medicines your care team has prescribed.  Preventive care, family planning, and ways to prevent chronic diseases.  Shots (vaccines)   HPV shots (up to age 26), if you've never had them before.  Hepatitis B shots (up to age 59), if you've never had them before.  COVID-19 shot: Get this shot when it's due.  Flu shot: Get a flu shot every year.  Tetanus shot: Get a tetanus shot every 10 years.  Pneumococcal, hepatitis A, and RSV shots: Ask your care team if you need these based on your risk.  Shingles shot (for age 50 and up)  General health tests  Diabetes screening:  Starting at age 35, Get screened for diabetes at least every 3 years.  If you are younger than age 35, ask your care team if you should be screened for diabetes.  Cholesterol test: At age 39, start having a cholesterol test every 5 years, or more often if advised.  Bone density scan (DEXA): At age 50, ask your care team if you should have this scan for osteoporosis (brittle bones).  Hepatitis C: Get tested at least once in your life.  STIs (sexually  transmitted infections)  Before age 24: Ask your care team if you should be screened for STIs.  After age 24: Get screened for STIs if you're at risk. You are at risk for STIs (including HIV) if:  You are sexually active with more than one person.  You don't use condoms every time.  You or a partner was diagnosed with a sexually transmitted infection.  If you are at risk for HIV, ask about PrEP medicine to prevent HIV.  Get tested for HIV at least once in your life, whether you are at risk for HIV or not.  Cancer screening tests  Cervical cancer screening: If you have a cervix, begin getting regular cervical cancer screening tests starting at age 21.  Breast cancer scan (mammogram): If you've ever had breasts, begin having regular mammograms starting at age 40. This is a scan to check for breast cancer.  Colon cancer screening: It is important to start screening for colon cancer at age 45.  Have a colonoscopy test every 10 years (or more often if you're at risk) Or, ask your provider about stool tests like a FIT test every year or Cologuard test every 3 years.  To learn more about your testing options, visit:   .  For help making a decision, visit:   https://bit.ly/lt45193.  Prostate cancer screening test: If you have a prostate, ask your care team if a prostate cancer screening test (PSA) at age 55 is right for you.  Lung cancer screening: If you are a current or former smoker ages 50 to 80, ask your care team if ongoing lung cancer screenings are right for you.  For informational purposes only. Not to replace the advice of your health care provider. Copyright   2023 [facility]. All rights reserved. Clinically reviewed by the [facility]. [employer] 839712 - REV 01/24.  Learning About Stress  What is stress?     Stress is your body's response to a hard situation. Your body can have a physical, emotional, or mental response. Stress is a fact of life for most people, and it  affects everyone differently. What causes stress for you may not be stressful for someone else.  A lot of things can cause stress. You may feel stress when you go on a job interview, take a test, or run a race. This kind of short-term stress is normal and even useful. It can help you if you need to work hard or react quickly. For example, stress can help you finish an important job on time.  Long-term stress is caused by ongoing stressful situations or events. Examples of long-term stress include long-term health problems, ongoing problems at work, or conflicts in your family. Long-term stress can harm your health.  How does stress affect your health?  When you are stressed, your body responds as though you are in danger. It makes hormones that speed up your heart, make you breathe faster, and give you a burst of energy. This is called the fight-or-flight stress response. If the stress is over quickly, your body goes back to normal and no harm is done.  But if stress happens too often or lasts too long, it can have bad effects. Long-term stress can make you more likely to get sick, and it can make symptoms of some diseases worse. If you tense up when you are stressed, you may develop neck, shoulder, or low back pain. Stress is linked to high blood pressure and heart disease.  Stress also harms your emotional health. It can make you landin, tense, or depressed. Your relationships may suffer, and you may not do well at work or school.  What can you do to manage stress?  You can try these things to help manage stress:   Do something active. Exercise or activity can help reduce stress. Walking is a great way to get started. Even everyday activities such as housecleaning or yard work can help.  Try yoga or leo chi. These techniques combine exercise and meditation. You may need some training at first to learn them.  Do something you enjoy. For example, listen to music or go to a movie. Practice your hobby or do volunteer  "work.  Meditate. This can help you relax, because you are not worrying about what happened before or what may happen in the future.  Do guided imagery. Imagine yourself in any setting that helps you feel calm. You can use online videos, books, or a teacher to guide you.  Do breathing exercises. For example:  From a standing position, bend forward from the waist with your knees slightly bent. Let your arms dangle close to the floor.  Breathe in slowly and deeply as you return to a standing position. Roll up slowly and lift your head last.  Hold your breath for just a few seconds in the standing position.  Breathe out slowly and bend forward from the waist.  Let your feelings out. Talk, laugh, cry, and express anger when you need to. Talking with supportive friends or family, a counselor, or a oriana leader about your feelings is a healthy way to relieve stress. Avoid discussing your feelings with people who make you feel worse.  Write. It may help to write about things that are bothering you. This helps you find out how much stress you feel and what is causing it. When you know this, you can find better ways to cope.  What can you do to prevent stress?  You might try some of these things to help prevent stress:  Manage your time. This helps you find time to do the things you want and need to do.  Get enough sleep. Your body recovers from the stresses of the day while you are sleeping.  Get support. Your family, friends, and community can make a difference in how you experience stress.  Limit your news feed. Avoid or limit time on social media or news that may make you feel stressed.  Do something active. Exercise or activity can help reduce stress. Walking is a great way to get started.  Where can you learn more?  Go to https://www.[employer].net/patiented  Enter N032 in the search box to learn more about \"Learning About Stress.\"  Current as of: October 24, 2024  Content Version: 14.5 2024-2025 [employer].   Care instructions adapted under license by your healthcare professional. If you have questions about a medical condition or this instruction, always ask your healthcare professional. [employer] disclaims any warranty or liability for your use of this information.    Recovering From Depression: Care Instructions  Overview    Sticking to your treatment plan is important as you recover from depression. It may take time for your symptoms to get better after you start treatment. Try not to give up if you don't feel better right away. Make sure you keep going to counseling and taking any prescribed medicine if they are part of your treatment plan.  Focus on things that can help you feel better, such as being with friends and family. Try to eat healthy foods, be active, and get enough sleep. Take things slowly as you begin to recover.  Follow-up care is a key part of your treatment and safety. Be sure to make and go to all appointments, and call your doctor if you are having problems. It's also a good idea to know your test results and keep a list of the medicines you take.  How can you care for yourself at home?  Be realistic  If you have a large task to do, break it up into smaller steps you can handle, and just do what you can.  You may want to put off important decisions until your depression has lifted. If you have plans that will have a major impact on your life, such as marriage, divorce, or a job change, try to wait a bit. Talk it over with friends and loved ones who can help you look at the overall picture first.  Reaching out to people for help is important. Do not isolate yourself. Let your family and friends help you. Find someone you can trust and confide in, and talk to that person.  Be patient, and be kind to yourself. Remember that depression is not your fault and is not something you can overcome with willpower alone. Treatment is important for depression, just like for any other  illness. Feeling better takes time, and your mood will improve little by little.  Stay active  Stay busy and get outside. Take a walk, or try some other light exercise.  Talk with your doctor about an exercise program. Exercise can help with mild depression.  Go to a movie or concert. Take part in a Christian activity or other social gathering. Go to a ball game.  Ask a friend to have dinner with you.  Take care of yourself  Eat healthy foods such as fresh fruits and vegetables, whole grains, and lean protein. If you have lost your appetite, eat small snacks rather than large meals.  Avoid using marijuana and other drugs and drinking alcohol. Do not take medicines that have not been prescribed for you. They may interfere with medicines you may be taking for depression, or they may make your depression worse.  Take your medicines exactly as they are prescribed. You may start to feel better within 1 to 3 weeks of taking antidepressant medicine. But it can take as many as 6 to 8 weeks to see more improvement. If you have questions or concerns about your medicines, or if you do not notice any improvement by 3 weeks, talk to your doctor.  Continue to take your medicine after your symptoms improve. Taking your medicine for at least 6 months after you feel better can help keep you from getting depressed again. If this isn't the first time you have been depressed, your doctor may recommend you to take medicine even longer.  If you have any side effects from your medicine, tell your doctor. Many side effects are mild and will go away on their own after you have been taking the medicine for a few weeks. Some may last longer. Talk to your doctor if side effects are bothering you too much. You might be able to try a different medicine.  Continue counseling. It may help prevent depression from returning, especially if you've had multiple episodes of depression. Talk with your counselor if you are having a hard time attending your  sessions or you think the sessions aren't working. Don't just stop going.  Get enough sleep. Talk to your doctor if you are having problems sleeping.  Avoid sleeping pills unless they are prescribed by the doctor treating your depression. Sleeping pills may make you groggy during the day, and they may interact with other medicine you are taking.  If you have any other illnesses, such as diabetes, heart disease, or high blood pressure, make sure to continue with your treatment. Tell your doctor about all of the medicines you take, including those with or without a prescription.  Where to get help 24 hours a day, 7 days a week  If you or someone you know talks about suicide, self-harm, a mental health crisis, a substance use crisis, or any other kind of emotional distress, get help right away. You can:  Call the Suicide and Crisis Lifeline at [employer].  Text HOME to 289007 to access the Crisis Text Line.  Consider saving these numbers in your phone.  Go to [employer] for more information or to chat online.  Call 811 anytime you think you may need emergency care. For example, call if:  You feel like hurting yourself or someone else.  Someone you know has depression and is about to attempt or is attempting suicide.  Where to get help 24 hours a day, 7 days a week  If you or someone you know talks about suicide, self-harm, a mental health crisis, a substance use crisis, or any other kind of emotional distress, get help right away. You can:  Call the Suicide and Crisis Lifeline at 855.  Text HOME to 233839 to access the Crisis Text Line.  Consider saving these numbers in your phone.  Go to [employer] for more information or to chat online.  Call your doctor now or seek immediate medical care if:  You hear voices.  Someone you know has depression and:  Starts to give away possessions.  Uses illegal drugs or drinks alcohol heavily.  Talks or writes about death, including writing suicide notes or talking about guns,  "knives, or pills.  Starts to spend a lot of time alone.  Acts very aggressively or suddenly appears calm.  Watch closely for changes in your health, and be sure to contact your doctor if:  You do not get better as expected.  Where can you learn more?  Go to https://www.[employer].net/patiented  Enter N529 in the search box to learn more about \"Recovering From Depression: Care Instructions.\"  Current as of: July 31, 2024  Content Version: 14.5 2024-2025 [employer].   Care instructions adapted under license by your healthcare professional. If you have questions about a medical condition or this instruction, always ask your healthcare professional. [employer] disclaims any warranty or liability for your use of this information.       "

## 2025-06-30 ENCOUNTER — HOSPITAL ENCOUNTER (OUTPATIENT)
Dept: MAMMOGRAPHY | Facility: CLINIC | Age: 55
Discharge: HOME | End: 2025-06-30
Attending: OBSTETRICS & GYNECOLOGY
Payer: COMMERCIAL

## 2025-06-30 DIAGNOSIS — R92.30 DENSE BREAST TISSUE: ICD-10-CM

## 2025-06-30 PROCEDURE — 77062 BREAST TOMOSYNTHESIS BI: CPT

## (undated) DEVICE — TUBING SUCTION 12"X1/4" N612

## (undated) DEVICE — FCP BIOPSY RADIAL JAW 4 JUMBO 3.2MM CHANNEL M00513370

## (undated) DEVICE — SPECIMEN CONTAINER 3OZ W/FORMALIN 59901

## (undated) DEVICE — ENDO FORCEP ENDOJAW BIOPSY 2.8MMX230CM FB-220U

## (undated) DEVICE — GOWN IMPERVIOUS 2XL BLUE

## (undated) DEVICE — KIT ENDO TURNOVER/PROCEDURE CARRY-ON 101822

## (undated) DEVICE — SOL WATER IRRIG 1000ML BOTTLE 2F7114

## (undated) DEVICE — SUCTION MANIFOLD NEPTUNE 2 SYS 1 PORT 702-025-000